# Patient Record
Sex: MALE | Race: WHITE | Employment: OTHER
[De-identification: names, ages, dates, MRNs, and addresses within clinical notes are randomized per-mention and may not be internally consistent; named-entity substitution may affect disease eponyms.]

---

## 2023-10-05 ENCOUNTER — OFFICE VISIT (OUTPATIENT)
Dept: FAMILY MEDICINE CLINIC | Facility: CLINIC | Age: 68
End: 2023-10-05
Payer: MEDICARE

## 2023-10-05 VITALS
WEIGHT: 151 LBS | RESPIRATION RATE: 18 BRPM | OXYGEN SATURATION: 96 % | SYSTOLIC BLOOD PRESSURE: 136 MMHG | HEIGHT: 67 IN | DIASTOLIC BLOOD PRESSURE: 82 MMHG | HEART RATE: 97 BPM | TEMPERATURE: 98.8 F | BODY MASS INDEX: 23.7 KG/M2

## 2023-10-05 DIAGNOSIS — Z13.220 LIPID SCREENING: ICD-10-CM

## 2023-10-05 DIAGNOSIS — Z13.31 DEPRESSION SCREENING NEGATIVE: ICD-10-CM

## 2023-10-05 DIAGNOSIS — Z12.11 COLON CANCER SCREENING: ICD-10-CM

## 2023-10-05 DIAGNOSIS — J44.9 CHRONIC OBSTRUCTIVE PULMONARY DISEASE, UNSPECIFIED COPD TYPE (HCC): Primary | ICD-10-CM

## 2023-10-05 DIAGNOSIS — Z72.0 TOBACCO ABUSE: ICD-10-CM

## 2023-10-05 DIAGNOSIS — E87.1 HYPONATREMIA: Primary | ICD-10-CM

## 2023-10-05 LAB
ALBUMIN SERPL-MCNC: 3.6 G/DL (ref 3.2–4.6)
ALBUMIN/GLOB SERPL: 1 (ref 0.4–1.6)
ALP SERPL-CCNC: 90 U/L (ref 50–136)
ALT SERPL-CCNC: 22 U/L (ref 12–65)
ANION GAP SERPL CALC-SCNC: 6 MMOL/L (ref 2–11)
AST SERPL-CCNC: 23 U/L (ref 15–37)
BASOPHILS # BLD: 0 K/UL (ref 0–0.2)
BASOPHILS NFR BLD: 0 % (ref 0–2)
BILIRUB SERPL-MCNC: 0.3 MG/DL (ref 0.2–1.1)
BUN SERPL-MCNC: 6 MG/DL (ref 8–23)
CALCIUM SERPL-MCNC: 9.2 MG/DL (ref 8.3–10.4)
CHLORIDE SERPL-SCNC: 112 MMOL/L (ref 101–110)
CO2 SERPL-SCNC: 27 MMOL/L (ref 21–32)
CREAT SERPL-MCNC: 0.9 MG/DL (ref 0.8–1.5)
DIFFERENTIAL METHOD BLD: ABNORMAL
EOSINOPHIL # BLD: 0 K/UL (ref 0–0.8)
EOSINOPHIL NFR BLD: 1 % (ref 0.5–7.8)
ERYTHROCYTE [DISTWIDTH] IN BLOOD BY AUTOMATED COUNT: 14.3 % (ref 11.9–14.6)
GLOBULIN SER CALC-MCNC: 3.7 G/DL (ref 2.8–4.5)
GLUCOSE SERPL-MCNC: 106 MG/DL (ref 65–100)
HCT VFR BLD AUTO: 33.8 % (ref 41.1–50.3)
HGB BLD-MCNC: 11.4 G/DL (ref 13.6–17.2)
IMM GRANULOCYTES # BLD AUTO: 0.1 K/UL (ref 0–0.5)
IMM GRANULOCYTES NFR BLD AUTO: 1 % (ref 0–5)
LYMPHOCYTES # BLD: 1.3 K/UL (ref 0.5–4.6)
LYMPHOCYTES NFR BLD: 22 % (ref 13–44)
MCH RBC QN AUTO: 42.5 PG (ref 26.1–32.9)
MCHC RBC AUTO-ENTMCNC: 33.7 G/DL (ref 31.4–35)
MCV RBC AUTO: 126.1 FL (ref 82–102)
MONOCYTES # BLD: 0.7 K/UL (ref 0.1–1.3)
MONOCYTES NFR BLD: 13 % (ref 4–12)
NEUTS SEG # BLD: 3.7 K/UL (ref 1.7–8.2)
NEUTS SEG NFR BLD: 63 % (ref 43–78)
NRBC # BLD: 0 K/UL (ref 0–0.2)
PLATELET # BLD AUTO: 141 K/UL (ref 150–450)
PMV BLD AUTO: 11.1 FL (ref 9.4–12.3)
POTASSIUM SERPL-SCNC: 3.9 MMOL/L (ref 3.5–5.1)
PROT SERPL-MCNC: 7.3 G/DL (ref 6.3–8.2)
RBC # BLD AUTO: 2.68 M/UL (ref 4.23–5.6)
SODIUM SERPL-SCNC: 145 MMOL/L (ref 133–143)
WBC # BLD AUTO: 5.8 K/UL (ref 4.3–11.1)

## 2023-10-05 PROCEDURE — 99204 OFFICE O/P NEW MOD 45 MIN: CPT | Performed by: FAMILY MEDICINE

## 2023-10-05 PROCEDURE — 1123F ACP DISCUSS/DSCN MKR DOCD: CPT | Performed by: FAMILY MEDICINE

## 2023-10-05 RX ORDER — DIAZEPAM 5 MG/1
5 TABLET ORAL 3 TIMES DAILY
COMMUNITY

## 2023-10-05 RX ORDER — NEBULIZER ACCESSORIES
1 KIT MISCELLANEOUS DAILY PRN
Qty: 1 KIT | Refills: 5 | Status: SHIPPED | OUTPATIENT
Start: 2023-10-05

## 2023-10-05 RX ORDER — ASPIRIN 81 MG/1
81 TABLET, CHEWABLE ORAL DAILY
COMMUNITY

## 2023-10-05 RX ORDER — IPRATROPIUM BROMIDE 21 UG/1
2 SPRAY, METERED NASAL EVERY 12 HOURS
COMMUNITY

## 2023-10-05 RX ORDER — IPRATROPIUM BROMIDE AND ALBUTEROL SULFATE 2.5; .5 MG/3ML; MG/3ML
1 SOLUTION RESPIRATORY (INHALATION) EVERY 4 HOURS
Qty: 360 EACH | Refills: 3
Start: 2023-10-05

## 2023-10-05 SDOH — ECONOMIC STABILITY: INCOME INSECURITY: HOW HARD IS IT FOR YOU TO PAY FOR THE VERY BASICS LIKE FOOD, HOUSING, MEDICAL CARE, AND HEATING?: NOT VERY HARD

## 2023-10-05 SDOH — ECONOMIC STABILITY: HOUSING INSECURITY
IN THE LAST 12 MONTHS, WAS THERE A TIME WHEN YOU DID NOT HAVE A STEADY PLACE TO SLEEP OR SLEPT IN A SHELTER (INCLUDING NOW)?: NO

## 2023-10-05 SDOH — ECONOMIC STABILITY: FOOD INSECURITY: WITHIN THE PAST 12 MONTHS, THE FOOD YOU BOUGHT JUST DIDN'T LAST AND YOU DIDN'T HAVE MONEY TO GET MORE.: NEVER TRUE

## 2023-10-05 SDOH — ECONOMIC STABILITY: FOOD INSECURITY: WITHIN THE PAST 12 MONTHS, YOU WORRIED THAT YOUR FOOD WOULD RUN OUT BEFORE YOU GOT MONEY TO BUY MORE.: SOMETIMES TRUE

## 2023-10-05 ASSESSMENT — ENCOUNTER SYMPTOMS
CHEST TIGHTNESS: 1
SHORTNESS OF BREATH: 1
DIARRHEA: 0
COUGH: 0
VOMITING: 0
NAUSEA: 0
WHEEZING: 1

## 2023-10-05 ASSESSMENT — PATIENT HEALTH QUESTIONNAIRE - PHQ9
1. LITTLE INTEREST OR PLEASURE IN DOING THINGS: 0
SUM OF ALL RESPONSES TO PHQ QUESTIONS 1-9: 0
SUM OF ALL RESPONSES TO PHQ QUESTIONS 1-9: 0
2. FEELING DOWN, DEPRESSED OR HOPELESS: 0
SUM OF ALL RESPONSES TO PHQ QUESTIONS 1-9: 0
SUM OF ALL RESPONSES TO PHQ9 QUESTIONS 1 & 2: 0
SUM OF ALL RESPONSES TO PHQ QUESTIONS 1-9: 0

## 2023-10-05 ASSESSMENT — COPD QUESTIONNAIRES: COPD: 1

## 2023-10-05 NOTE — PROGRESS NOTES
Alexandra Good (:  1955) is a 77 y.o. male,New patient, here for evaluation of the following chief complaint(s):  New Patient (Est care/ general check up. Fasting. H/O Copd, needs new nebulizer, smoker)         ASSESSMENT/PLAN:  1. Chronic obstructive pulmonary disease, unspecified COPD type ([address])- stable  -     Comprehensive Metabolic Panel; Future  -     CBC with Auto Differential; Future  -     ipratropium 0.5 mg-albuterol 2.5 mg (DUONEB) 0.5-2.5 (3) MG/3ML SOLN nebulizer solution; Inhale 3 mLs into the lungs every 4 hours, Disp-360 each, R-3NO PRINT  -     Nebulizer  2. Lipid screening- lipids denied  -     Comprehensive Metabolic Panel; Future  3. Colon cancer screening  -     POCT FECAL IMMUNOCHEMICAL TEST (FIT) ()  4. Tobacco abuse-cessation handout  5. Depression screening negative    FU 1 year and prn         Subjective   SUBJECTIVE/OBJECTIVE:  COPD  He complains of chest tightness, shortness of breath and wheezing. There is no cough. This is a chronic problem. The current episode started more than 1 year ago. The problem occurs every several days. The problem has been unchanged. Pertinent negatives include no chest pain. Review of Systems   Constitutional:  Negative for chills and fatigue. Respiratory:  Positive for shortness of breath and wheezing. Negative for cough. Cardiovascular:  Negative for chest pain and leg swelling. Gastrointestinal:  Negative for diarrhea, nausea and vomiting. Objective   Physical Exam  Vitals and nursing note reviewed. Constitutional:       General: He is not in acute distress. Appearance: Normal appearance. He is normal weight. HENT:      Head: Normocephalic and atraumatic. Nose: Nose normal.      Mouth/Throat:      Pharynx: Oropharynx is clear. Eyes:      Extraocular Movements: Extraocular movements intact.       Conjunctiva/sclera: Conjunctivae normal.   Cardiovascular:      Rate and Rhythm: Normal rate and regular

## 2023-10-25 ENCOUNTER — NURSE ONLY (OUTPATIENT)
Dept: FAMILY MEDICINE CLINIC | Facility: CLINIC | Age: 68
End: 2023-10-25

## 2023-10-25 DIAGNOSIS — E87.1 HYPONATREMIA: ICD-10-CM

## 2023-10-25 LAB — SODIUM SERPL-SCNC: 139 MMOL/L (ref 133–143)

## 2024-12-16 ENCOUNTER — TELEPHONE (OUTPATIENT)
Dept: FAMILY MEDICINE CLINIC | Facility: CLINIC | Age: 69
End: 2024-12-16

## 2024-12-23 ENCOUNTER — OFFICE VISIT (OUTPATIENT)
Dept: FAMILY MEDICINE CLINIC | Facility: CLINIC | Age: 69
End: 2024-12-23

## 2024-12-23 VITALS
TEMPERATURE: 98.3 F | RESPIRATION RATE: 18 BRPM | OXYGEN SATURATION: 96 % | SYSTOLIC BLOOD PRESSURE: 120 MMHG | HEIGHT: 67 IN | WEIGHT: 148 LBS | HEART RATE: 88 BPM | DIASTOLIC BLOOD PRESSURE: 62 MMHG | BODY MASS INDEX: 23.23 KG/M2

## 2024-12-23 DIAGNOSIS — Z13.220 LIPID SCREENING: ICD-10-CM

## 2024-12-23 DIAGNOSIS — Z72.0 TOBACCO ABUSE: ICD-10-CM

## 2024-12-23 DIAGNOSIS — K52.9 CHRONIC DIARRHEA: ICD-10-CM

## 2024-12-23 DIAGNOSIS — R09.89 DECREASED PULSES IN FEET: ICD-10-CM

## 2024-12-23 DIAGNOSIS — J44.9 CHRONIC OBSTRUCTIVE PULMONARY DISEASE, UNSPECIFIED COPD TYPE (HCC): Primary | ICD-10-CM

## 2024-12-23 DIAGNOSIS — D53.9 MACROCYTIC ANEMIA: ICD-10-CM

## 2024-12-23 LAB
ALBUMIN SERPL-MCNC: 3.5 G/DL (ref 3.2–4.6)
ALBUMIN/GLOB SERPL: 0.9 (ref 1–1.9)
ALP SERPL-CCNC: 87 U/L (ref 40–129)
ALT SERPL-CCNC: 8 U/L (ref 8–55)
ANION GAP SERPL CALC-SCNC: 12 MMOL/L (ref 7–16)
AST SERPL-CCNC: 20 U/L (ref 15–37)
BASOPHILS # BLD: 0 K/UL (ref 0–0.2)
BASOPHILS NFR BLD: 0 % (ref 0–2)
BILIRUB SERPL-MCNC: <0.2 MG/DL (ref 0–1.2)
BUN SERPL-MCNC: 9 MG/DL (ref 8–23)
CALCIUM SERPL-MCNC: 9 MG/DL (ref 8.8–10.2)
CHLORIDE SERPL-SCNC: 101 MMOL/L (ref 98–107)
CHOLEST SERPL-MCNC: 197 MG/DL (ref 0–200)
CO2 SERPL-SCNC: 27 MMOL/L (ref 20–29)
CREAT SERPL-MCNC: 0.94 MG/DL (ref 0.8–1.3)
DIFFERENTIAL METHOD BLD: ABNORMAL
EOSINOPHIL # BLD: 0 K/UL (ref 0–0.8)
EOSINOPHIL NFR BLD: 0 % (ref 0.5–7.8)
ERYTHROCYTE [DISTWIDTH] IN BLOOD BY AUTOMATED COUNT: 14.2 % (ref 11.9–14.6)
GLOBULIN SER CALC-MCNC: 3.7 G/DL (ref 2.3–3.5)
GLUCOSE SERPL-MCNC: 96 MG/DL (ref 70–99)
HCT VFR BLD AUTO: 34.1 % (ref 41.1–50.3)
HDLC SERPL-MCNC: 30 MG/DL (ref 40–60)
HDLC SERPL: 6.5 (ref 0–5)
HGB BLD-MCNC: 11.6 G/DL (ref 13.6–17.2)
IMM GRANULOCYTES # BLD AUTO: 0.1 K/UL (ref 0–0.5)
IMM GRANULOCYTES NFR BLD AUTO: 1 % (ref 0–5)
LDLC SERPL CALC-MCNC: 112 MG/DL (ref 0–100)
LYMPHOCYTES # BLD: 1.3 K/UL (ref 0.5–4.6)
LYMPHOCYTES NFR BLD: 23 % (ref 13–44)
MCH RBC QN AUTO: 41 PG (ref 26.1–32.9)
MCHC RBC AUTO-ENTMCNC: 34 G/DL (ref 31.4–35)
MCV RBC AUTO: 120.5 FL (ref 82–102)
MONOCYTES # BLD: 1.2 K/UL (ref 0.1–1.3)
MONOCYTES NFR BLD: 21 % (ref 4–12)
NEUTS SEG # BLD: 3.2 K/UL (ref 1.7–8.2)
NEUTS SEG NFR BLD: 55 % (ref 43–78)
NRBC # BLD: 0 K/UL (ref 0–0.2)
PLATELET # BLD AUTO: 164 K/UL (ref 150–450)
PLATELET COMMENT: ADEQUATE
PMV BLD AUTO: 10.8 FL (ref 9.4–12.3)
POTASSIUM SERPL-SCNC: 3.8 MMOL/L (ref 3.5–5.1)
PROT SERPL-MCNC: 7.2 G/DL (ref 6.3–8.2)
RBC # BLD AUTO: 2.83 M/UL (ref 4.23–5.6)
RBC MORPH BLD: ABNORMAL
RBC MORPH BLD: ABNORMAL
SODIUM SERPL-SCNC: 140 MMOL/L (ref 136–145)
TRIGL SERPL-MCNC: 276 MG/DL (ref 0–150)
VIT B12 SERPL-MCNC: 537 PG/ML (ref 193–986)
VLDLC SERPL CALC-MCNC: 55 MG/DL (ref 6–23)
WBC # BLD AUTO: 5.8 K/UL (ref 4.3–11.1)
WBC MORPH BLD: ABNORMAL

## 2024-12-23 ASSESSMENT — ENCOUNTER SYMPTOMS
COUGH: 0
CHEST TIGHTNESS: 1
VOMITING: 0
SHORTNESS OF BREATH: 1
DIARRHEA: 0
WHEEZING: 1
NAUSEA: 0

## 2024-12-23 ASSESSMENT — COPD QUESTIONNAIRES: COPD: 1

## 2024-12-23 NOTE — PROGRESS NOTES
Lei Garcia (:  1955) is a 69 y.o. male,Established patient, here for evaluation of the following chief complaint(s):  Follow-up (Chronic care follow up.), COPD (Stable, chronic), Nicotine Dependence (Would like to quit), and Circulatory Problem (Told by nurse from insurance)         Assessment & Plan  Chronic obstructive pulmonary disease, unspecified COPD type (HCC)   Chronic, at goal (stable), continue current treatment plan  Repeat labs/stable  Orders:  •  CBC with Auto Differential; Future  •  Comprehensive Metabolic Panel; Future  •  Comprehensive Metabolic Panel  •  CBC with Auto Differential    Macrocytic anemia   Chronic, not at goal (unstable), recheck CBC with B12 level    Orders:  •  CBC with Auto Differential; Future  •  Vitamin B12; Future  •  Vitamin B12  •  CBC with Auto Differential    Lipid screening    Check labs    Orders:  •  Comprehensive Metabolic Panel; Future  •  Lipid Panel; Future  •  Lipid Panel  •  Comprehensive Metabolic Panel    Tobacco abuse   Chronic, not at goal (unstable), lifestyle modifications recommended  Pt going to try to quit on his own. I encouraged him to let me know if he needs help.       Decreased pulses in feet    Bilateral feet with poor pulses, hair loss- check dopplers (arterial) bilaterally    Orders:  •  Vascular duplex lower extremity arteries bilateral; Future    Chronic diarrhea   Chronic, not at goal (unstable), refer to GI for eval/treatment    Orders:  •  [facility] - Yoni Perdomo MD, Gastroenterology, [facility]      Return in about 6 months (around 2025) for fasting chronic care.       Subjective   COPD  He complains of chest tightness, shortness of breath and wheezing. There is no cough. This is a chronic problem. The current episode started more than 1 year ago. The problem occurs intermittently. The problem has been unchanged. Pertinent negatives include no chest pain.   Nicotine Dependence  Presents for follow-up visit. Symptoms are

## 2025-01-10 ENCOUNTER — TELEPHONE (OUTPATIENT)
Dept: FAMILY MEDICINE CLINIC | Facility: CLINIC | Age: 70
End: 2025-01-10

## 2025-01-10 DIAGNOSIS — R23.0 TOE CYANOSIS: ICD-10-CM

## 2025-01-10 DIAGNOSIS — R09.89 DECREASED PEDAL PULSES: Primary | ICD-10-CM

## 2025-01-10 NOTE — TELEPHONE ENCOUNTER
Patient called their insurance and they stated that the vascular duplex lower lower extremity arteries bilateral is not covered. Can you send in for an alternative procedure?       Call back at [phone]

## 2025-01-27 ENCOUNTER — HOSPITAL ENCOUNTER (OUTPATIENT)
Dept: ULTRASOUND IMAGING | Age: 70
Discharge: HOME | End: 2025-01-30
Attending: FAMILY MEDICINE
Payer: MEDICARE

## 2025-01-27 DIAGNOSIS — R23.0 TOE CYANOSIS: ICD-10-CM

## 2025-01-27 DIAGNOSIS — R09.89 DECREASED PEDAL PULSES: ICD-10-CM

## 2025-01-27 PROCEDURE — 93922 UPR/L XTREMITY ART 2 LEVELS: CPT

## 2025-01-27 PROCEDURE — 93925 LOWER EXTREMITY STUDY: CPT | Performed by: RADIOLOGY

## 2025-01-31 ENCOUNTER — TELEPHONE (OUTPATIENT)
Dept: FAMILY MEDICINE CLINIC | Facility: CLINIC | Age: 70
End: 2025-01-31

## 2025-01-31 NOTE — TELEPHONE ENCOUNTER
Patient wife called and request to talk with nurse or Dr. Ruffin, pt would like to know the results of the exams.    Thank you.

## 2025-02-02 DIAGNOSIS — I73.9 PAD (PERIPHERAL ARTERY DISEASE) (HCC): Primary | ICD-10-CM

## 2025-02-03 NOTE — TELEPHONE ENCOUNTER
Called patient and released the results of the doppler test and stated that vascular surgery will be reaching out as the referral was placed.

## 2025-02-13 ENCOUNTER — OFFICE VISIT (OUTPATIENT)
Dept: VASCULAR SURGERY | Age: 70
End: 2025-02-13
Payer: MEDICARE

## 2025-02-13 VITALS
HEIGHT: 67 IN | SYSTOLIC BLOOD PRESSURE: 136 MMHG | OXYGEN SATURATION: 98 % | WEIGHT: 148 LBS | BODY MASS INDEX: 23.23 KG/M2 | DIASTOLIC BLOOD PRESSURE: 83 MMHG

## 2025-02-13 DIAGNOSIS — I73.9 PAD (PERIPHERAL ARTERY DISEASE) (HCC): Primary | ICD-10-CM

## 2025-02-13 DIAGNOSIS — F17.200 HIGH DEPENDENCE ON SMOKING: ICD-10-CM

## 2025-02-13 PROCEDURE — 99204 OFFICE O/P NEW MOD 45 MIN: CPT | Performed by: STUDENT IN AN ORGANIZED HEALTH CARE EDUCATION/TRAINING PROGRAM

## 2025-02-13 PROCEDURE — 1159F MED LIST DOCD IN RCRD: CPT | Performed by: STUDENT IN AN ORGANIZED HEALTH CARE EDUCATION/TRAINING PROGRAM

## 2025-02-13 PROCEDURE — 1123F ACP DISCUSS/DSCN MKR DOCD: CPT | Performed by: STUDENT IN AN ORGANIZED HEALTH CARE EDUCATION/TRAINING PROGRAM

## 2025-02-13 NOTE — PROGRESS NOTES
is recommended.    No orders of the defined types were placed in this encounter.      01/27/25    VAS DUP LOWER EXT ARTERIES BILATERAL W ZAID 01/27/2025  3:08 PM (Final)    Narrative  TITLE: LOWER extremity arterial ultrasound examination.    INDICATION: Skin color changes. Claudication.    TECHNIQUE: Grayscale, color, and Doppler interrogation performed.  The entire  length of all of the arteries of the extremity were evaluated.    COMPARISON: None.      ZAID:  Right = 0.70 with digital pressure of 17  Left = 0.73 with digital pressure of 10    RIGHT LOWER EXTREMITY: Proximal waveforms are triphasic and biphasic. There is  mid SFA occlusion with proximal popliteal reconstitution. Diminished distal  waveforms.    LEFT LOWER EXTREMITY: Proximal by merrily biphasic waveforms with distal more  monophasic waveforms..    ABDOMEN:  No aneurysm is seen.    Impression  1. Bilateral ABIs are moderately diminished.  2. There is right lower extremity mid SFA occlusion with proximal popliteal  reconstitution..      Electronically signed by HEIDI LOZA    Signed by: Heidi Loza MD on 1/27/2025  3:08 PM      Physical Examination:   /83 (Site: Right Upper Arm, Position: Sitting, Cuff Size: Medium Adult)   Ht 1.702 m (5' 7\")   Wt 67.1 kg (148 lb)   BMI 23.18 kg/m²     General: No acute distress  HENT: AT  CV: Regular rhythm.    LUNG: No respiratory distress on RA  Abdominal: No distension.  Extremities: No wounds or edema, motor and sensation grossly intact  Vascular:     No past medical history on file.    Current Outpatient Medications   Medication Sig Dispense Refill    aspirin 81 MG chewable tablet Take 1 tablet by mouth daily      diazePAM (VALIUM) 5 MG tablet Take 1 tablet by mouth 3 times daily.      ipratropium (ATROVENT) 0.03 % nasal spray 2 sprays by Each Nostril route in the morning and 2 sprays in the evening.      ipratropium 0.5 mg-albuterol 2.5 mg (DUONEB) 0.5-2.5 (3) MG/3ML SOLN nebulizer solution

## 2025-03-11 ENCOUNTER — RESULTS FOLLOW-UP (OUTPATIENT)
Dept: FAMILY MEDICINE CLINIC | Facility: CLINIC | Age: 70
End: 2025-03-11

## 2025-03-11 ENCOUNTER — OFFICE VISIT (OUTPATIENT)
Dept: FAMILY MEDICINE CLINIC | Facility: CLINIC | Age: 70
End: 2025-03-11
Payer: MEDICARE

## 2025-03-11 VITALS
BODY MASS INDEX: 21.82 KG/M2 | TEMPERATURE: 98 F | HEIGHT: 67 IN | HEART RATE: 90 BPM | DIASTOLIC BLOOD PRESSURE: 80 MMHG | RESPIRATION RATE: 98 BRPM | WEIGHT: 139 LBS | SYSTOLIC BLOOD PRESSURE: 132 MMHG

## 2025-03-11 DIAGNOSIS — D53.9 MACROCYTIC ANEMIA: ICD-10-CM

## 2025-03-11 DIAGNOSIS — K52.9 CHRONIC DIARRHEA: ICD-10-CM

## 2025-03-11 DIAGNOSIS — Z00.00 INITIAL MEDICARE ANNUAL WELLNESS VISIT: Primary | ICD-10-CM

## 2025-03-11 DIAGNOSIS — Z13.6 SCREENING FOR AAA (ABDOMINAL AORTIC ANEURYSM): ICD-10-CM

## 2025-03-11 DIAGNOSIS — Z87.891 PERSONAL HISTORY OF TOBACCO USE, PRESENTING HAZARDS TO HEALTH: ICD-10-CM

## 2025-03-11 DIAGNOSIS — Z72.89 OTHER PROBLEMS RELATED TO LIFESTYLE: ICD-10-CM

## 2025-03-11 DIAGNOSIS — Z11.59 NEED FOR HEPATITIS C SCREENING TEST: ICD-10-CM

## 2025-03-11 LAB
ALBUMIN SERPL-MCNC: 3.5 G/DL (ref 3.2–4.6)
ALBUMIN/GLOB SERPL: 0.9 (ref 1–1.9)
ALP SERPL-CCNC: 86 U/L (ref 40–129)
ALT SERPL-CCNC: 9 U/L (ref 8–55)
ANION GAP SERPL CALC-SCNC: 16 MMOL/L (ref 7–16)
AST SERPL-CCNC: 18 U/L (ref 15–37)
BASOPHILS # BLD: 0.02 K/UL (ref 0–0.2)
BASOPHILS NFR BLD: 0.2 % (ref 0–2)
BILIRUB SERPL-MCNC: 0.3 MG/DL (ref 0–1.2)
BUN SERPL-MCNC: 12 MG/DL (ref 8–23)
CALCIUM SERPL-MCNC: 9.5 MG/DL (ref 8.8–10.2)
CHLORIDE SERPL-SCNC: 90 MMOL/L (ref 98–107)
CO2 SERPL-SCNC: 26 MMOL/L (ref 20–29)
CREAT SERPL-MCNC: 1.37 MG/DL (ref 0.8–1.3)
DIFFERENTIAL METHOD BLD: ABNORMAL
EOSINOPHIL # BLD: 0 K/UL (ref 0–0.8)
EOSINOPHIL NFR BLD: 0 % (ref 0.5–7.8)
ERYTHROCYTE [DISTWIDTH] IN BLOOD BY AUTOMATED COUNT: 13.7 % (ref 11.9–14.6)
GLOBULIN SER CALC-MCNC: 3.9 G/DL (ref 2.3–3.5)
GLUCOSE SERPL-MCNC: 143 MG/DL (ref 70–99)
HCT VFR BLD AUTO: 29.9 % (ref 41.1–50.3)
HCV AB SER QL: NONREACTIVE
HGB BLD-MCNC: 10.2 G/DL (ref 13.6–17.2)
IMM GRANULOCYTES # BLD AUTO: 0.07 K/UL (ref 0–0.5)
IMM GRANULOCYTES NFR BLD AUTO: 0.6 % (ref 0–5)
LYMPHOCYTES # BLD: 1.18 K/UL (ref 0.5–4.6)
LYMPHOCYTES NFR BLD: 10.6 % (ref 13–44)
MCH RBC QN AUTO: 38.9 PG (ref 26.1–32.9)
MCHC RBC AUTO-ENTMCNC: 34.1 G/DL (ref 31.4–35)
MCV RBC AUTO: 114.1 FL (ref 82–102)
MONOCYTES # BLD: 1.17 K/UL (ref 0.1–1.3)
MONOCYTES NFR BLD: 10.5 % (ref 4–12)
NEUTS SEG # BLD: 8.68 K/UL (ref 1.7–8.2)
NEUTS SEG NFR BLD: 78.1 % (ref 43–78)
NRBC # BLD: 0 K/UL (ref 0–0.2)
PLATELET # BLD AUTO: 253 K/UL (ref 150–450)
PMV BLD AUTO: 10.5 FL (ref 9.4–12.3)
POTASSIUM SERPL-SCNC: 3 MMOL/L (ref 3.5–5.1)
PROT SERPL-MCNC: 7.4 G/DL (ref 6.3–8.2)
RBC # BLD AUTO: 2.62 M/UL (ref 4.23–5.6)
SODIUM SERPL-SCNC: 132 MMOL/L (ref 136–145)
WBC # BLD AUTO: 11.1 K/UL (ref 4.3–11.1)

## 2025-03-11 PROCEDURE — G2211 COMPLEX E/M VISIT ADD ON: HCPCS | Performed by: FAMILY MEDICINE

## 2025-03-11 PROCEDURE — 1159F MED LIST DOCD IN RCRD: CPT | Performed by: FAMILY MEDICINE

## 2025-03-11 PROCEDURE — 99214 OFFICE O/P EST MOD 30 MIN: CPT | Performed by: FAMILY MEDICINE

## 2025-03-11 PROCEDURE — G0438 PPPS, INITIAL VISIT: HCPCS | Performed by: FAMILY MEDICINE

## 2025-03-11 PROCEDURE — 1123F ACP DISCUSS/DSCN MKR DOCD: CPT | Performed by: FAMILY MEDICINE

## 2025-03-11 PROCEDURE — 1160F RVW MEDS BY RX/DR IN RCRD: CPT | Performed by: FAMILY MEDICINE

## 2025-03-11 SDOH — ECONOMIC STABILITY: FOOD INSECURITY: WITHIN THE PAST 12 MONTHS, YOU WORRIED THAT YOUR FOOD WOULD RUN OUT BEFORE YOU GOT MONEY TO BUY MORE.: NEVER TRUE

## 2025-03-11 SDOH — ECONOMIC STABILITY: FOOD INSECURITY: WITHIN THE PAST 12 MONTHS, THE FOOD YOU BOUGHT JUST DIDN'T LAST AND YOU DIDN'T HAVE MONEY TO GET MORE.: NEVER TRUE

## 2025-03-11 ASSESSMENT — PATIENT HEALTH QUESTIONNAIRE - PHQ9
SUM OF ALL RESPONSES TO PHQ QUESTIONS 1-9: 0
1. LITTLE INTEREST OR PLEASURE IN DOING THINGS: NOT AT ALL
SUM OF ALL RESPONSES TO PHQ QUESTIONS 1-9: 0
2. FEELING DOWN, DEPRESSED OR HOPELESS: NOT AT ALL

## 2025-03-11 ASSESSMENT — LIFESTYLE VARIABLES
HOW OFTEN DO YOU HAVE A DRINK CONTAINING ALCOHOL: 2-3 TIMES A WEEK
HOW MANY STANDARD DRINKS CONTAINING ALCOHOL DO YOU HAVE ON A TYPICAL DAY: 1 OR 2

## 2025-03-11 ASSESSMENT — ENCOUNTER SYMPTOMS
VOMITING: 0
NAUSEA: 0
DIARRHEA: 1
COUGH: 0
SHORTNESS OF BREATH: 0

## 2025-03-11 NOTE — PROGRESS NOTES
[facility]  [facility]  [address] [place] [address]   [phone] Fax [phone]    Lei Garcia, : 1955, AGE: 70 y.o.  Chief Complaint   Patient presents with    Other     Chronic diarrhea with incontinence.     Medicare AWV     Vitals:    25 1441   BP: 132/80   Pulse: 90   Resp: (!) 98   Temp: 98 °F (36.7 °C)     Social History     Tobacco Use    Smoking status: Every Day     Types: Cigarettes    Smokeless tobacco: Never   Vaping Use    Vaping status: Never Used   Substance Use Topics    Alcohol use: Yes     Current Outpatient Medications   Medication Sig Dispense Refill    aspirin 81 MG chewable tablet Take 1 tablet by mouth daily      diazePAM (VALIUM) 5 MG tablet Take 1 tablet by mouth 3 times daily.      ipratropium (ATROVENT) 0.03 % nasal spray 2 sprays by Each Nostril route in the morning and 2 sprays in the evening.      ipratropium 0.5 mg-albuterol 2.5 mg (DUONEB) 0.5-2.5 (3) MG/3ML SOLN nebulizer solution Inhale 3 mLs into the lungs every 4 hours 360 each 3    Nebulizers (COMPRESSOR/NEBULIZER) MISC Use 4x/d as directed  COPD J44.9 1 each 0    Respiratory Therapy Supplies (NEBULIZER/TUBING/MOUTHPIECE) KIT 1 kit by Does not apply route daily as needed (sob/wheeze) J44.9 1 kit 5     No current facility-administered medications for this visit.     No Known Allergies  HPI   History of Present Illness  History of Present Illness  The patient is a 70-year-old white male who presents today for complaints of chronic diarrhea with stool incontinence at night. The stool is very watery, almost like urine when it comes out at night, but it is not urine per the patient. He also needs a Medicare Annual Wellness Visit.     Reviewed pertinent past medical history, surgical history, social history, and family history as noted in patient record.     ROS  Review of Systems   Constitutional:  Negative for chills and fatigue.   Respiratory:  Negative for cough and

## 2025-03-11 NOTE — PATIENT INSTRUCTIONS
Learning About Being Active as an Older Adult  Why is being active important as you get older?     Being active is one of the best things you can do for your health. And it's never too late to start. Being active--or getting active, if you aren't already--has definite benefits. It can:  Give you more energy,  Keep your mind sharp.  Improve balance to reduce your risk of falls.  Help you manage chronic illness with fewer medicines.  No matter how old you are, how fit you are, or what health problems you have, there is a form of activity that will work for you. And the more physical activity you can do, the better your overall health will be.  What kinds of activity can help you stay healthy?  Being more active will make your daily activities easier. Physical activity includes planned exercise and things you do in daily life. There are four types of activity:  Aerobic.  Doing aerobic activity makes your heart and lungs strong.  Includes walking, dancing, and gardening.  Aim for at least 2½ hours spread throughout the week.  It improves your energy and can help you sleep better.  Muscle-strengthening.  This type of activity can help maintain muscle and strengthen bones.  Includes climbing stairs, using resistance bands, and lifting or carrying heavy loads.  Aim for at least twice a week.  It can help protect the knees and other joints.  Stretching.  Stretching gives you better range of motion in joints and muscles.  Includes upper arm stretches, calf stretches, and gentle yoga.  Aim for at least twice a week, preferably after your muscles are warmed up from other activities.  It can help you function better in daily life.  Balancing.  This helps you stay coordinated and have good posture.  Includes heel-to-toe walking, alberta chi, and certain types of yoga.  Aim for at least 3 days a week.  It can reduce your risk of falling.  Even if you have a hard time meeting the recommendations, it's better to be more active

## 2025-03-12 DIAGNOSIS — R73.09 ELEVATED GLUCOSE: Primary | ICD-10-CM

## 2025-03-12 DIAGNOSIS — R79.89 ELEVATED SERUM CREATININE: ICD-10-CM

## 2025-03-12 DIAGNOSIS — E87.1 HYPONATREMIA: ICD-10-CM

## 2025-03-12 DIAGNOSIS — E87.6 HYPOKALEMIA: ICD-10-CM

## 2025-03-14 DIAGNOSIS — K52.9 CHRONIC DIARRHEA: ICD-10-CM

## 2025-03-14 LAB — C. DIFFICILE TOXIN MOLECULAR: NEGATIVE

## 2025-03-17 LAB
BACTERIA SPEC CULT: NORMAL
SERVICE CMNT-IMP: NORMAL

## 2025-03-18 LAB
SPECIMEN SOURCE: NORMAL
WHITE BLOOD CELLS (WBC), STOOL: NORMAL

## 2025-03-20 LAB
O+P SPEC MICRO: NORMAL
O+P STL CONC: NORMAL
SPECIMEN SOURCE: NORMAL

## 2025-03-24 ENCOUNTER — LAB (OUTPATIENT)
Dept: FAMILY MEDICINE CLINIC | Facility: CLINIC | Age: 70
End: 2025-03-24

## 2025-03-24 ENCOUNTER — RESULTS FOLLOW-UP (OUTPATIENT)
Dept: FAMILY MEDICINE CLINIC | Facility: CLINIC | Age: 70
End: 2025-03-24

## 2025-03-24 DIAGNOSIS — R79.89 ELEVATED SERUM CREATININE: ICD-10-CM

## 2025-03-24 DIAGNOSIS — E87.6 HYPOKALEMIA: ICD-10-CM

## 2025-03-24 DIAGNOSIS — R73.09 ELEVATED GLUCOSE: ICD-10-CM

## 2025-03-24 DIAGNOSIS — E87.1 HYPONATREMIA: ICD-10-CM

## 2025-03-24 DIAGNOSIS — E87.1 HYPONATREMIA: Primary | ICD-10-CM

## 2025-03-24 LAB
ANION GAP SERPL CALC-SCNC: 14 MMOL/L (ref 7–16)
BUN SERPL-MCNC: 12 MG/DL (ref 8–23)
CALCIUM SERPL-MCNC: 9.6 MG/DL (ref 8.8–10.2)
CHLORIDE SERPL-SCNC: 89 MMOL/L (ref 98–107)
CO2 SERPL-SCNC: 32 MMOL/L (ref 20–29)
CREAT SERPL-MCNC: 1.3 MG/DL (ref 0.8–1.3)
EST. AVERAGE GLUCOSE BLD GHB EST-MCNC: 114 MG/DL
GLUCOSE SERPL-MCNC: 118 MG/DL (ref 70–99)
HBA1C MFR BLD: 5.6 % (ref 0–5.6)
POTASSIUM SERPL-SCNC: 3.2 MMOL/L (ref 3.5–5.1)
SODIUM SERPL-SCNC: 136 MMOL/L (ref 136–145)

## 2025-03-31 ENCOUNTER — RESULTS FOLLOW-UP (OUTPATIENT)
Dept: FAMILY MEDICINE CLINIC | Facility: CLINIC | Age: 70
End: 2025-03-31

## 2025-03-31 ENCOUNTER — LAB (OUTPATIENT)
Dept: FAMILY MEDICINE CLINIC | Facility: CLINIC | Age: 70
End: 2025-03-31

## 2025-03-31 DIAGNOSIS — R79.89 ELEVATED SERUM CREATININE: ICD-10-CM

## 2025-03-31 DIAGNOSIS — E87.1 HYPONATREMIA: ICD-10-CM

## 2025-03-31 DIAGNOSIS — E87.6 HYPOKALEMIA: Primary | ICD-10-CM

## 2025-03-31 LAB
ANION GAP SERPL CALC-SCNC: 13 MMOL/L (ref 7–16)
BUN SERPL-MCNC: 11 MG/DL (ref 8–23)
CALCIUM SERPL-MCNC: 9.3 MG/DL (ref 8.8–10.2)
CHLORIDE SERPL-SCNC: 84 MMOL/L (ref 98–107)
CO2 SERPL-SCNC: 33 MMOL/L (ref 20–29)
CREAT SERPL-MCNC: 1.17 MG/DL (ref 0.8–1.3)
GLUCOSE SERPL-MCNC: 111 MG/DL (ref 70–99)
POTASSIUM SERPL-SCNC: 3.1 MMOL/L (ref 3.5–5.1)
SODIUM SERPL-SCNC: 130 MMOL/L (ref 136–145)

## 2025-04-02 ENCOUNTER — TELEPHONE (OUTPATIENT)
Dept: FAMILY MEDICINE CLINIC | Facility: CLINIC | Age: 70
End: 2025-04-02

## 2025-04-23 ENCOUNTER — TELEPHONE (OUTPATIENT)
Age: 70
End: 2025-04-23

## 2025-04-23 ENCOUNTER — PREP FOR PROCEDURE (OUTPATIENT)
Age: 70
End: 2025-04-23

## 2025-04-23 ENCOUNTER — OFFICE VISIT (OUTPATIENT)
Age: 70
End: 2025-04-23
Payer: MEDICARE

## 2025-04-23 VITALS
SYSTOLIC BLOOD PRESSURE: 143 MMHG | BODY MASS INDEX: 21.03 KG/M2 | OXYGEN SATURATION: 96 % | DIASTOLIC BLOOD PRESSURE: 83 MMHG | HEIGHT: 67 IN | WEIGHT: 134 LBS | HEART RATE: 79 BPM

## 2025-04-23 DIAGNOSIS — K12.0 APHTHOUS ULCER OF MOUTH: ICD-10-CM

## 2025-04-23 DIAGNOSIS — R63.4 UNINTENTIONAL WEIGHT LOSS: ICD-10-CM

## 2025-04-23 DIAGNOSIS — D64.9 ANEMIA, UNSPECIFIED TYPE: ICD-10-CM

## 2025-04-23 DIAGNOSIS — C18.7 MALIGNANT NEOPLASM OF SIGMOID COLON (HCC): ICD-10-CM

## 2025-04-23 DIAGNOSIS — K52.9 CHRONIC DIARRHEA: ICD-10-CM

## 2025-04-23 DIAGNOSIS — K52.9 CHRONIC DIARRHEA: Primary | ICD-10-CM

## 2025-04-23 LAB
CRP SERPL-MCNC: 5.2 MG/DL (ref 0–0.4)
ERYTHROCYTE [SEDIMENTATION RATE] IN BLOOD: 32 MM/HR
FERRITIN SERPL-MCNC: 698 NG/ML (ref 8–388)
IRON SATN MFR SERPL: 23 % (ref 20–50)
IRON SERPL-MCNC: 55 UG/DL (ref 35–100)
TIBC SERPL-MCNC: 242 UG/DL (ref 240–450)
UIBC SERPL-MCNC: 187 UG/DL (ref 112–347)

## 2025-04-23 PROCEDURE — 1159F MED LIST DOCD IN RCRD: CPT | Performed by: PHYSICIAN ASSISTANT

## 2025-04-23 PROCEDURE — 99204 OFFICE O/P NEW MOD 45 MIN: CPT | Performed by: PHYSICIAN ASSISTANT

## 2025-04-23 PROCEDURE — 1160F RVW MEDS BY RX/DR IN RCRD: CPT | Performed by: PHYSICIAN ASSISTANT

## 2025-04-23 PROCEDURE — 1123F ACP DISCUSS/DSCN MKR DOCD: CPT | Performed by: PHYSICIAN ASSISTANT

## 2025-04-23 RX ORDER — SODIUM CHLORIDE 0.9 % (FLUSH) 0.9 %
5-40 SYRINGE (ML) INJECTION EVERY 12 HOURS SCHEDULED
Status: CANCELLED | OUTPATIENT
Start: 2025-04-23

## 2025-04-23 RX ORDER — SODIUM CHLORIDE 0.9 % (FLUSH) 0.9 %
5-40 SYRINGE (ML) INJECTION PRN
Status: CANCELLED | OUTPATIENT
Start: 2025-04-23

## 2025-04-23 RX ORDER — OMEPRAZOLE 20 MG/1
20 CAPSULE, DELAYED RELEASE ORAL DAILY
COMMUNITY

## 2025-04-23 RX ORDER — SODIUM CHLORIDE 9 MG/ML
25 INJECTION, SOLUTION INTRAVENOUS PRN
Status: CANCELLED | OUTPATIENT
Start: 2025-04-23

## 2025-04-23 NOTE — TELEPHONE ENCOUNTER
Patient in office, per Melissa Grinnell schedule colonoscopy and EGD ASAP. Scheduled with Dr. Schwab on Friday 4/25/2025 at [facility]. Miralax prep instructions given to the patient in office.      Items to purchase at pharmacy 5 days before your procedure:  Dulcolax tablets-- (LAXATIVE ONLY- not stool softener)  238-gram bottle of MiraLAX  64 ounces Gatorade, Crystal Light, Apple juice (NO RED BLUE OR PURPLE IN COLOR)    The day before your test: Thursday 4/24/2025  Clear liquids only for the entire day (water, coffee, tea, Sprite, 7-up, Ginger ale, popsicles, chicken broth, beef broth, apple juice, Gatorade, crystal lite) NO MILK, CREAMER, OR DAIRY PRODUCTS. NOTHING RED, BLUE OR PURPLE IN COLOR. NO SOLID FOODS.   Mix MiraLax with Gatorade, Crystal Lite or Apple juice (NO RED, BLUE OR PURPLE)  At 12:00 PM Take 2 Dulcolax tablets with 16 ounces of water.  At 12:00 PM Start drinking MiraLax solution. Drink 8 ounces every 30 minutes until gone.  NO MORE LIQUIDS AFTER MIDNIGHT

## 2025-04-23 NOTE — PROGRESS NOTES
Lei Garcia (:  1955) is a 70 y.o. male new patient referred to our office for evaluation of the following chief complaint(s):  Diarrhea (For over a month)        Assessment & Plan   ASSESSMENT/PLAN:  1. Chronic diarrhea  -     Calprotectin Stool; Future  -     C-Reactive Protein; Future  -     Sedimentation Rate; Future  2. Anemia, unspecified type  -     Ferritin; Future  -     Iron and TIBC; Future  3. Unintentional weight loss  4. Aphthous ulcer of mouth      Assessment & Plan  1. Diarrhea and blood in stool: Chronic for many years. No prior colonoscopy.   - Stool culture, c.diff testing, O&P, fecal leukocytes were negative 3/14/2025.   - Check inflammatory markers.  - Schedule colonoscopy to r/o colon mass and/or IBD.   - Counseled on basic diarrhea measures.   - Utilize Imodium 1-2 tablets QID PRN.     2. Unintentional weight loss: 15 lbs since 2024.  - Will perform simultaneous EGD with colonoscopy.  - Consider CT imaging CAP if endoscopy unrevealing.    3. Anemia: Chronic.   - Check iron studies.  - Endoscopy to r/o GI blood loss.     4. Oral sores: Chronic. ?IBD  - May warrant additional work-up if no evidence of IBD ?immunocompromised    Follow-up: after endoscopy    Results  Labs   - Hemoglobin: Low   - Infectious diarrhea test: Negative   - Culture: Negative   - C. difficile test: Negative    Return for scheduled EGD, scheduled colonoscopy, follow-up visit 1-2 weeks after procedure.         Subjective   SUBJECTIVE/OBJECTIVE  Lei Garcia is a 70 y.o. year old male referred to our office for evaluation of chronic diarrhea. Referral note reviewed from 3/11/2025.  Same-day labs show chronic anemia with Hgb 10.2.  C.diff, stool culture, O&P, fecal leukocytes were negative 3/14/2025. No prior GI or endoscopy records discovered on chart review.    History of Present Illness  The patient presents for evaluation of diarrhea.    Experiencing diarrhea for 2-3 months, initially evaluated by

## 2025-04-23 NOTE — PATIENT INSTRUCTIONS
For diarrhea:   Take Imodium 1 to 2 tablets up to 4 times daily as needed for diarrhea. If you are frequently having urgent diarrhea following meals, schedule Imodium dose 30 minutes prior to meals. Be sure to stay well hydrated. Avoid alcohol as well as food and beverages high in fat, sugar, artificial sweeteners or stimulants such as nicotine or caffeine. Increase daily fiber intake to 25-35 grams daily either by dietary intake or an over-the-counter psyllium husk fiber supplement. Keep a diet journal to evaluate for any food triggers.  You can try OTC IBgard (peppermint oil supplement) as needed for abdominal cramping, bloating, or discomfort.

## 2025-04-24 NOTE — PROGRESS NOTES
Patient verified name, , and procedure.    Type: 1A; abbreviated assessment per anesthesia guidelines.    Labs per anesthesia: None.  EKG: Not needed, per anesthesia guidelines.     Instructed pt that they will be notified the day before their procedure by the GI Lab for time of arrival if their procedure is [place] and Pre-op for [place] cases. Arrival times should be called by 5 pm. If no phone is received the patient should contact their respective hospital. The GI lab telephone number is [phone] and ES Pre-op is [phone].     Follow diet and prep instructions per office, including NPO status.      Bath or shower the night before and the am of surgery with non-moisturizing soap. No lotions, oils, powders, perfumes, or cologne on skin. No make up, eye make up or jewelry. Wear loose fitting comfortable, clean clothing.     Must have adult present in building the entire time .     Medications to take on the day of procedure: Diazepam- if needed, Duoneb nebulizer, Omeprazole, per anesthesia guidelines.    Medications to hold: Aspirin- hold starting now, per anesthesia guidelines.     Patient instructed to hold all vitamins/supplements 7 days prior to surgery and NSAIDS 5 days prior to surgery. Verbalized understanding.     The following discharge instructions reviewed with patient: medication given during procedure may cause drowsiness for several hours, therefore, do not drive or operate machinery for remainder of the day. You may not drink alcohol on the day of your procedure, please resume regular diet and activity unless otherwise directed. You may experience abdominal distention for several hours that is relieved by the passage of gas. Contact your physician if you have any of the following: fever or chills, severe abdominal pain or excessive amount of bleeding or a large amount when having a bowel movement. Occasional specks of blood with bowel movement would not be unusual. Verbalizes understanding.

## 2025-04-24 NOTE — PROGRESS NOTES
Was speaking with patient when his phone hung up and now goes straight to a mailbox that hasn't been set up. Called wife's number and left message to be here at 1015 for 1143 start time,  requirements and NPO x sip with meds and prep. Left Dr. Cavanaugh office phone number for any questions.

## 2025-04-25 ENCOUNTER — HOSPITAL ENCOUNTER (OUTPATIENT)
Age: 70
Discharge: HOME | End: 2025-04-25
Attending: STUDENT IN AN ORGANIZED HEALTH CARE EDUCATION/TRAINING PROGRAM | Admitting: STUDENT IN AN ORGANIZED HEALTH CARE EDUCATION/TRAINING PROGRAM
Payer: MEDICARE

## 2025-04-25 ENCOUNTER — ANESTHESIA (OUTPATIENT)
Dept: ENDOSCOPY | Age: 70
End: 2025-04-25
Payer: MEDICARE

## 2025-04-25 ENCOUNTER — TELEPHONE (OUTPATIENT)
Dept: GASTROENTEROLOGY | Age: 70
End: 2025-04-25

## 2025-04-25 ENCOUNTER — ANESTHESIA EVENT (OUTPATIENT)
Dept: ENDOSCOPY | Age: 70
End: 2025-04-25
Payer: MEDICARE

## 2025-04-25 VITALS
TEMPERATURE: 98 F | HEIGHT: 67 IN | OXYGEN SATURATION: 96 % | BODY MASS INDEX: 21.5 KG/M2 | DIASTOLIC BLOOD PRESSURE: 60 MMHG | WEIGHT: 137 LBS | SYSTOLIC BLOOD PRESSURE: 125 MMHG | RESPIRATION RATE: 12 BRPM | HEART RATE: 66 BPM

## 2025-04-25 DIAGNOSIS — K52.9 CHRONIC DIARRHEA: ICD-10-CM

## 2025-04-25 DIAGNOSIS — K12.0 APHTHOUS ULCER OF MOUTH: ICD-10-CM

## 2025-04-25 DIAGNOSIS — K63.89 MASS OF COLON: Primary | ICD-10-CM

## 2025-04-25 DIAGNOSIS — R63.4 UNINTENTIONAL WEIGHT LOSS: ICD-10-CM

## 2025-04-25 DIAGNOSIS — D64.9 ANEMIA, UNSPECIFIED TYPE: ICD-10-CM

## 2025-04-25 LAB — CEA SERPL-MCNC: 1.3 NG/ML (ref 0–3.8)

## 2025-04-25 PROCEDURE — 2580000003 HC RX 258

## 2025-04-25 PROCEDURE — 3609012400 HC EGD TRANSORAL BIOPSY SINGLE/MULTIPLE: Performed by: STUDENT IN AN ORGANIZED HEALTH CARE EDUCATION/TRAINING PROGRAM

## 2025-04-25 PROCEDURE — 82378 CARCINOEMBRYONIC ANTIGEN: CPT

## 2025-04-25 PROCEDURE — 88305 TISSUE EXAM BY PATHOLOGIST: CPT

## 2025-04-25 PROCEDURE — 2709999900 HC NON-CHARGEABLE SUPPLY: Performed by: STUDENT IN AN ORGANIZED HEALTH CARE EDUCATION/TRAINING PROGRAM

## 2025-04-25 PROCEDURE — 6360000002 HC RX W HCPCS

## 2025-04-25 PROCEDURE — 3700000000 HC ANESTHESIA ATTENDED CARE: Performed by: STUDENT IN AN ORGANIZED HEALTH CARE EDUCATION/TRAINING PROGRAM

## 2025-04-25 PROCEDURE — 3609008300 HC SIGMOIDOSCOPY W/BIOPSY SINGLE/MULTIPLE: Performed by: STUDENT IN AN ORGANIZED HEALTH CARE EDUCATION/TRAINING PROGRAM

## 2025-04-25 PROCEDURE — 88342 IMHCHEM/IMCYTCHM 1ST ANTB: CPT

## 2025-04-25 PROCEDURE — 3700000001 HC ADD 15 MINUTES (ANESTHESIA): Performed by: STUDENT IN AN ORGANIZED HEALTH CARE EDUCATION/TRAINING PROGRAM

## 2025-04-25 PROCEDURE — 7100000011 HC PHASE II RECOVERY - ADDTL 15 MIN: Performed by: STUDENT IN AN ORGANIZED HEALTH CARE EDUCATION/TRAINING PROGRAM

## 2025-04-25 PROCEDURE — 7100000010 HC PHASE II RECOVERY - FIRST 15 MIN: Performed by: STUDENT IN AN ORGANIZED HEALTH CARE EDUCATION/TRAINING PROGRAM

## 2025-04-25 RX ORDER — LIDOCAINE HYDROCHLORIDE 20 MG/ML
INJECTION, SOLUTION EPIDURAL; INFILTRATION; INTRACAUDAL; PERINEURAL
Status: DISCONTINUED | OUTPATIENT
Start: 2025-04-25 | End: 2025-04-25 | Stop reason: SDUPTHER

## 2025-04-25 RX ORDER — PROPOFOL 10 MG/ML
INJECTION, EMULSION INTRAVENOUS
Status: DISCONTINUED | OUTPATIENT
Start: 2025-04-25 | End: 2025-04-25 | Stop reason: SDUPTHER

## 2025-04-25 RX ORDER — SODIUM CHLORIDE, SODIUM LACTATE, POTASSIUM CHLORIDE, CALCIUM CHLORIDE 600; 310; 30; 20 MG/100ML; MG/100ML; MG/100ML; MG/100ML
INJECTION, SOLUTION INTRAVENOUS
Status: DISCONTINUED | OUTPATIENT
Start: 2025-04-25 | End: 2025-04-25 | Stop reason: SDUPTHER

## 2025-04-25 RX ADMIN — PROPOFOL 60 MG: 10 INJECTION, EMULSION INTRAVENOUS at 11:06

## 2025-04-25 RX ADMIN — PROPOFOL 140 MCG/KG/MIN: 10 INJECTION, EMULSION INTRAVENOUS at 11:07

## 2025-04-25 RX ADMIN — LIDOCAINE HYDROCHLORIDE 100 MG: 20 INJECTION, SOLUTION EPIDURAL; INFILTRATION; INTRACAUDAL; PERINEURAL at 11:06

## 2025-04-25 RX ADMIN — PROPOFOL 20 MG: 10 INJECTION, EMULSION INTRAVENOUS at 11:12

## 2025-04-25 RX ADMIN — SODIUM CHLORIDE, SODIUM LACTATE, POTASSIUM CHLORIDE, AND CALCIUM CHLORIDE: 600; 310; 30; 20 INJECTION, SOLUTION INTRAVENOUS at 11:01

## 2025-04-25 ASSESSMENT — LIFESTYLE VARIABLES: SMOKING_STATUS: 1

## 2025-04-25 ASSESSMENT — PAIN SCALES - GENERAL: PAINLEVEL_OUTOF10: 0

## 2025-04-25 ASSESSMENT — PAIN - FUNCTIONAL ASSESSMENT: PAIN_FUNCTIONAL_ASSESSMENT: NONE - DENIES PAIN

## 2025-04-25 NOTE — ANESTHESIA POSTPROCEDURE EVALUATION
Department of Anesthesiology  Postprocedure Note    Patient: Lei Gracia  MRN: [id number]  YOB: 1955  Date of evaluation: 4/25/2025    Procedure Summary       Date: 04/25/25 Room / Location: [facility] ENDO 04 / [facility] ENDOSCOPY    Anesthesia Start: 1102 Anesthesia Stop: 1137    Procedures:       SIGMOIDOSCOPY BIOPSY FLEXIBLE/ TATTOO      ESOPHAGOGASTRODUODENOSCOPY BIOPSY (Upper GI Region) Diagnosis:       Chronic diarrhea      Anemia, unspecified type      Unintentional weight loss      Aphthous ulcer of mouth      (Chronic diarrhea [K52.9])      (Anemia, unspecified type [D64.9])      (Unintentional weight loss [R63.4])      (Aphthous ulcer of mouth [K12.0])    Surgeons: Lexie Schwab MD Responsible Provider: Ryan Smith MD    Anesthesia Type: TIVA ASA Status: 3            Anesthesia Type: TIVA    Franklin Phase I: Franklin Score: 10    Franklin Phase II: Franklin Score: 8    Anesthesia Post Evaluation    Patient location during evaluation: bedside  Patient participation: complete - patient participated  Level of consciousness: awake  Airway patency: patent  Nausea & Vomiting: no nausea  Cardiovascular status: hemodynamically stable  Respiratory status: acceptable, nonlabored ventilation and spontaneous ventilation  Hydration status: euvolemic    No notable events documented.

## 2025-04-25 NOTE — TELEPHONE ENCOUNTER
Called patient to give Radiology scheduling hours/ number and to schedule follow up appointment with Melissa Grinnell, PA. No answer and vm has not been set up. Called patient's wife (on KASHIF, Libby). No answer. LVM with radiology scheduling for CT. Also advised or referrals that were placed and to be aware. LM with office number to return call for any questions/ concerns or to schedule follow up with Melissa Grinnell, PA

## 2025-04-25 NOTE — DISCHARGE INSTRUCTIONS
Gastrointestinal Esophagogastroduodenoscopy (EGD) - Upper Exam Discharge Instructions    1. Call Dr. Schwab at [phone] for any problems or questions.    2. Contact the doctor's office for follow up appointment as directed.    3. Medication may cause drowsiness for several hours, therefore:  Do not drive or operate machinery for remainder of the day.    No alcohol today.  Do not make any important or legal decisions for 24 hours.  Do not sign any legal documents for 24 hours.    5. Ordinarily, you may resume regular diet and activity after exam unless otherwise specified by your physician.    6. For mild soreness in your throat you may use Cepacol throat lozenges or warm salt-water gargles as needed.        Gastrointestinal Colonoscopy/Flexible Sigmoidoscopy - Lower Exam Discharge Instructions      Because of air put into your colon during exam, you may experience some abdominal distension, relieved by the passage of gas, for several hours.    Contact your physician if you have any of the following:  Excessive amount of bleeding - large amount when having a bowel movement.  Occasional specks of blood with bowel movement would not be unusual.  Severe abdominal pain  Fever or Chills    Polyp Removal - follow these additional instructions   Take Metamucil - 1 tablespoon in juice every morning for 3 days, as needed for constipation.      Any additional instructions:      Resume previous diet.   Continue present medications.   Return to normal activities tomorrow.   Await for pathology letter in the next 1-2 weeks through [facility], if not signed up for [facility], we will mail the results letter to your address.   We will arrange you a follow up visit with Billie in 3-4 weeks ([address] across from the hospital entrance, [address])

## 2025-04-25 NOTE — ANESTHESIA PRE PROCEDURE
Department of Anesthesiology  Preprocedure Note       Name:  Lei Garcia   Age:  70 y.o.  :  1955                                          MRN:  [id number]         Date:  2025      Surgeon: Surgeon(s):  Lexie Schwab MD    Procedure: Procedure(s):  COLORECTAL CANCER SCREENING, NOT HIGH RISK  ESOPHAGOGASTRODUODENOSCOPY    Medications prior to admission:   Prior to Admission medications    Medication Sig Start Date End Date Taking? Authorizing Provider   omeprazole (PRILOSEC) 20 MG delayed release capsule Take 1 capsule by mouth daily   Yes ProviderMarlene MD   aspirin 81 MG chewable tablet Take 1 tablet by mouth daily   Yes Provider, MD Marlene   diazePAM (VALIUM) 5 MG tablet Take 1 tablet by mouth 3 times daily.   Yes ProviderMarlene MD   ipratropium 0.5 mg-albuterol 2.5 mg (DUONEB) 0.5-2.5 (3) MG/3ML SOLN nebulizer solution Inhale 3 mLs into the lungs every 4 hours 10/5/23  Yes Emory Ruffin Jr., MD   Nebulizers (COMPRESSOR/NEBULIZER) MISC Use 4x/d as directed  COPD J44.9 10/5/23  Yes Emory Ruffin Jr., MD   Respiratory Therapy Supplies (NEBULIZER/TUBING/MOUTHPIECE) KIT 1 kit by Does not apply route daily as needed (sob/wheeze) J44.9 10/5/23  Yes Emory Ruffin Jr., MD       Current medications:    No current facility-administered medications for this encounter.       Allergies:  No Known Allergies    Problem List:    Patient Active Problem List   Diagnosis Code   • Chronic diarrhea K52.9   • Anemia D64.9   • Unintentional weight loss R63.4   • Aphthous ulcer of mouth K12.0       Past Medical History:        Diagnosis Date   • Anemia    • COPD (chronic obstructive pulmonary disease) (HCC)    • PAD (peripheral artery disease)        Past Surgical History:        Procedure Laterality Date   • CATARACT EXTRACTION Bilateral        Social History:    Social History     Tobacco Use   • Smoking status: Former     Current packs/day: 0.00     Types: Cigarettes     Quit

## 2025-04-25 NOTE — H&P
evaluated by PCP on 2025. Previously had loose stools but regular bowel movements once daily in the morning. Condition worsened with bowel movements increasing to 2-3 times daily and nocturnal incontinence requiring diapers. Stools are watery, often with bright red blood, especially at night. No abdominal pain, nausea, or vomiting.    Known history of acid reflux, managed with Prilosec. No dysphagia.    Unintentional weight loss since diarrhea onset, despite usual diet. Weight was 148 pounds in 2024 and 2025, now 134 pounds after losing 15 pounds since 2025.    Developed oral sores concurrent with diarrhea onset. Taking B12 supplement, occasionally Tylenol for pain, and [place] for cold symptoms. Consumes Mountain Dew and Gatorade daily. Using Imodium for diarrhea, not regularly.    No chronic cough, shortness of breath, or chest pain. Occasional back pain when lying in bed.    SOCIAL HISTORY  Alcohol: Drinks six beers a week.  Tobacco: Quit smoking in February after 60 years of smoking a pack a day.    FAMILY HISTORY  Brother: Prostate cancer,   Negative for GI cancer (stomach, esophageal, liver, pancreas, colon, rectal)  Negative for Crohn's disease or ulcerative colitis     Past Medical History   History reviewed. No pertinent past medical history.        Past Surgical History   History reviewed. No pertinent surgical history.         Allergies   No Known Allergies         Family History   History reviewed. No pertinent family history.        Current Facility-Administered Medications          Current Outpatient Medications   Medication Sig Dispense Refill    omeprazole (PRILOSEC) 20 MG delayed release capsule Take 1 capsule by mouth daily        aspirin 81 MG chewable tablet Take 1 tablet by mouth daily        diazePAM (VALIUM) 5 MG tablet Take 1 tablet by mouth 3 times daily.        ipratropium (ATROVENT) 0.03 % nasal spray 2 sprays by Each Nostril route in the morning and 2 sprays  in the evening. (Patient not taking: Reported on 4/23/2025)        ipratropium 0.5 mg-albuterol 2.5 mg (DUONEB) 0.5-2.5 (3) MG/3ML SOLN nebulizer solution Inhale 3 mLs into the lungs every 4 hours (Patient not taking: Reported on 4/23/2025) 360 each 3    Nebulizers (COMPRESSOR/NEBULIZER) MISC Use 4x/d as directed  COPD J44.9 (Patient not taking: Reported on 4/23/2025) 1 each 0    Respiratory Therapy Supplies (NEBULIZER/TUBING/MOUTHPIECE) KIT 1 kit by Does not apply route daily as needed (sob/wheeze) J44.9 (Patient not taking: Reported on 4/23/2025) 1 kit 5      No current facility-administered medications for this visit.            Review of Systems  All other systems reviewed and are negative except as noted above.            Objective      Vitals:     04/23/25 1049   BP: (!) 143/83   Pulse: 79   SpO2: 96%         Physical Exam     Physical Exam  General Appearance: no acute distress.  Vital signs: Weight 134 pounds.  HEENT: Dental decay and mouth sores.  Respiratory: normal effort with no respiratory distress.  Cardiovascular: normal rate.  Gastrointestinal: No tenderness.  Skin: dry, no jaundice.  Neurological: alert and oriented.  Psychiatric: mood, behavior, thought content and judgment normal.                    The patient (or guardian, if applicable) and other individuals in attendance with the patient were advised that Artificial Intelligence will be utilized during this visit to record, process the conversation to generate a clinical note, and support improvement of the AI technology. The patient (or guardian, if applicable) and other individuals in attendance at the appointment consented to the use of AI, including the recording.             An electronic signature was used to authenticate this note.

## 2025-04-28 ENCOUNTER — HOSPITAL ENCOUNTER (OUTPATIENT)
Dept: CT IMAGING | Age: 70
Discharge: HOME | End: 2025-05-01
Attending: STUDENT IN AN ORGANIZED HEALTH CARE EDUCATION/TRAINING PROGRAM
Payer: MEDICARE

## 2025-04-28 DIAGNOSIS — K63.89 MASS OF COLON: ICD-10-CM

## 2025-04-28 LAB — CREAT BLD-MCNC: 1.12 MG/DL (ref 0.8–1.5)

## 2025-04-28 PROCEDURE — 74178 CT ABD&PLV WO CNTR FLWD CNTR: CPT

## 2025-04-28 PROCEDURE — 6360000004 HC RX CONTRAST MEDICATION: Performed by: STUDENT IN AN ORGANIZED HEALTH CARE EDUCATION/TRAINING PROGRAM

## 2025-04-28 PROCEDURE — 82565 ASSAY OF CREATININE: CPT

## 2025-04-28 RX ORDER — IOPAMIDOL 755 MG/ML
100 INJECTION, SOLUTION INTRAVASCULAR
Status: COMPLETED | OUTPATIENT
Start: 2025-04-28 | End: 2025-04-28

## 2025-04-28 RX ADMIN — IOPAMIDOL 100 ML: 755 INJECTION, SOLUTION INTRAVENOUS at 14:57

## 2025-04-29 DIAGNOSIS — K63.89 COLONIC MASS: Primary | ICD-10-CM

## 2025-04-29 DIAGNOSIS — C79.9 METASTATIC ADENOCARCINOMA (HCC): ICD-10-CM

## 2025-04-30 ENCOUNTER — CLINICAL DOCUMENTATION (OUTPATIENT)
Dept: CASE MANAGEMENT | Age: 70
End: 2025-04-30

## 2025-04-30 SDOH — ECONOMIC STABILITY: FOOD INSECURITY: WITHIN THE PAST 12 MONTHS, THE FOOD YOU BOUGHT JUST DIDN'T LAST AND YOU DIDN'T HAVE MONEY TO GET MORE.: NEVER TRUE

## 2025-04-30 SDOH — ECONOMIC STABILITY: FOOD INSECURITY: WITHIN THE PAST 12 MONTHS, YOU WORRIED THAT YOUR FOOD WOULD RUN OUT BEFORE YOU GOT MONEY TO BUY MORE.: NEVER TRUE

## 2025-04-30 SDOH — HEALTH STABILITY: PHYSICAL HEALTH: ON AVERAGE, HOW MANY MINUTES DO YOU ENGAGE IN EXERCISE AT THIS LEVEL?: 20 MIN

## 2025-04-30 SDOH — ECONOMIC STABILITY: INCOME INSECURITY: IN THE LAST 12 MONTHS, WAS THERE A TIME WHEN YOU WERE NOT ABLE TO PAY THE MORTGAGE OR RENT ON TIME?: NO

## 2025-04-30 SDOH — ECONOMIC STABILITY: TRANSPORTATION INSECURITY
IN THE PAST 12 MONTHS, HAS LACK OF TRANSPORTATION KEPT YOU FROM MEETINGS, WORK, OR FROM GETTING THINGS NEEDED FOR DAILY LIVING?: NO

## 2025-04-30 SDOH — HEALTH STABILITY: PHYSICAL HEALTH: ON AVERAGE, HOW MANY DAYS PER WEEK DO YOU ENGAGE IN MODERATE TO STRENUOUS EXERCISE (LIKE A BRISK WALK)?: 5 DAYS

## 2025-04-30 SDOH — ECONOMIC STABILITY: TRANSPORTATION INSECURITY
IN THE PAST 12 MONTHS, HAS THE LACK OF TRANSPORTATION KEPT YOU FROM MEDICAL APPOINTMENTS OR FROM GETTING MEDICATIONS?: NO

## 2025-04-30 ASSESSMENT — SOCIAL DETERMINANTS OF HEALTH (SDOH)
HOW OFTEN DO YOU ATTENT MEETINGS OF THE CLUB OR ORGANIZATION YOU BELONG TO?: NEVER
DO YOU BELONG TO ANY CLUBS OR ORGANIZATIONS SUCH AS CHURCH GROUPS UNIONS, FRATERNAL OR ATHLETIC GROUPS, OR SCHOOL GROUPS?: NO
WITHIN THE LAST YEAR, HAVE TO BEEN RAPED OR FORCED TO HAVE ANY KIND OF SEXUAL ACTIVITY BY YOUR PARTNER OR EX-PARTNER?: NO
HOW HARD IS IT FOR YOU TO PAY FOR THE VERY BASICS LIKE FOOD, HOUSING, MEDICAL CARE, AND HEATING?: NOT VERY HARD
WITHIN THE LAST YEAR, HAVE YOU BEEN AFRAID OF YOUR PARTNER OR EX-PARTNER?: NO
HOW OFTEN DO YOU GET TOGETHER WITH FRIENDS OR RELATIVES?: ONCE A WEEK
WITHIN THE LAST YEAR, HAVE YOU BEEN HUMILIATED OR EMOTIONALLY ABUSED IN OTHER WAYS BY YOUR PARTNER OR EX-PARTNER?: NO
IN A TYPICAL WEEK, HOW MANY TIMES DO YOU TALK ON THE PHONE WITH FAMILY, FRIENDS, OR NEIGHBORS?: TWICE A WEEK
HOW OFTEN DO YOU ATTEND CHURCH OR RELIGIOUS SERVICES?: NEVER
WITHIN THE LAST YEAR, HAVE YOU BEEN KICKED, HIT, SLAPPED, OR OTHERWISE PHYSICALLY HURT BY YOUR PARTNER OR EX-PARTNER?: NO

## 2025-04-30 ASSESSMENT — PATIENT HEALTH QUESTIONNAIRE - PHQ9
SUM OF ALL RESPONSES TO PHQ QUESTIONS 1-9: 0
SUM OF ALL RESPONSES TO PHQ QUESTIONS 1-9: 0
2. FEELING DOWN, DEPRESSED OR HOPELESS: NOT AT ALL
SUM OF ALL RESPONSES TO PHQ QUESTIONS 1-9: 0
1. LITTLE INTEREST OR PLEASURE IN DOING THINGS: NOT AT ALL
SUM OF ALL RESPONSES TO PHQ QUESTIONS 1-9: 0

## 2025-04-30 ASSESSMENT — LIFESTYLE VARIABLES
HOW OFTEN DO YOU HAVE A DRINK CONTAINING ALCOHOL: 2-4 TIMES A MONTH
HOW MANY STANDARD DRINKS CONTAINING ALCOHOL DO YOU HAVE ON A TYPICAL DAY: 3 OR 4

## 2025-04-30 NOTE — PROGRESS NOTES
Nurse Navigation outreach related to intake    Navigation Intake 4/30/2025    I reviewed family history of cancer. The role of navigation services have been reviewed and updated.  Referring provider notified of intake and upcoming appointments.    CT A/P on 4-28-25  Patient was scheduled for Ct of chest on 4-29-28 but he states he was unaware of this appt. Spoke with Tasia Zabala RN and she stated to have pt go ahead and attend scheduled visit w Dr Guevara w/o CT of chest.     Current Gastroenterologist: Josselin  Appointment with Gastroenterology: 4-25-25  Appointment with Oncology: 5-1-25  Appointment with Surgery: n/a    My Chart message to patient with navigation contact information and upcoming appointments. Patient does not have SeeWhyhart  Attached link for calendar for Cancer Support in the Community provided by the [facility] with opportunities for programs both in person and virtually.   Attached link for the [facility] Connection for counseling opportunities, support groups, financial assistance, and general physical support.     Navigation Assessment:  The following were identified as potential barriers to care:   1. May need more reinforcement with plan of care         Goal of next outreach: discuss plan of care after oncology consult  Time Spent: 30 mins

## 2025-05-01 ENCOUNTER — OFFICE VISIT (OUTPATIENT)
Dept: ONCOLOGY | Age: 70
End: 2025-05-01
Payer: MEDICARE

## 2025-05-01 ENCOUNTER — HOSPITAL ENCOUNTER (OUTPATIENT)
Dept: LAB | Age: 70
Discharge: HOME | End: 2025-05-01
Payer: MEDICARE

## 2025-05-01 VITALS
DIASTOLIC BLOOD PRESSURE: 69 MMHG | OXYGEN SATURATION: 100 % | HEIGHT: 67 IN | WEIGHT: 133.1 LBS | RESPIRATION RATE: 16 BRPM | SYSTOLIC BLOOD PRESSURE: 115 MMHG | TEMPERATURE: 98 F | BODY MASS INDEX: 20.89 KG/M2 | HEART RATE: 73 BPM

## 2025-05-01 DIAGNOSIS — C18.9 MALIGNANT NEOPLASM OF COLON, UNSPECIFIED PART OF COLON (HCC): Primary | ICD-10-CM

## 2025-05-01 DIAGNOSIS — C18.7 CANCER OF SIGMOID COLON (HCC): ICD-10-CM

## 2025-05-01 LAB
ALBUMIN SERPL-MCNC: 3.6 G/DL (ref 3.2–4.6)
ALBUMIN/GLOB SERPL: 0.9 (ref 1–1.9)
ALP SERPL-CCNC: 80 U/L (ref 40–129)
ALT SERPL-CCNC: 8 U/L (ref 8–55)
ANION GAP SERPL CALC-SCNC: 13 MMOL/L (ref 7–16)
AST SERPL-CCNC: 16 U/L (ref 15–37)
BASOPHILS # BLD: 0.02 K/UL (ref 0–0.2)
BASOPHILS NFR BLD: 0.2 % (ref 0–2)
BILIRUB SERPL-MCNC: 0.3 MG/DL (ref 0–1.2)
BUN SERPL-MCNC: 11 MG/DL (ref 8–23)
CALCIUM SERPL-MCNC: 9.7 MG/DL (ref 8.8–10.2)
CEA SERPL-MCNC: 1.5 NG/ML (ref 0–3.8)
CHLORIDE SERPL-SCNC: 86 MMOL/L (ref 98–107)
CO2 SERPL-SCNC: 27 MMOL/L (ref 20–29)
CREAT SERPL-MCNC: 1.05 MG/DL (ref 0.8–1.3)
DIFFERENTIAL METHOD BLD: ABNORMAL
EOSINOPHIL # BLD: 0.02 K/UL (ref 0–0.8)
EOSINOPHIL NFR BLD: 0.2 % (ref 0.5–7.8)
ERYTHROCYTE [DISTWIDTH] IN BLOOD BY AUTOMATED COUNT: 15.1 % (ref 11.9–14.6)
FERRITIN SERPL-MCNC: 669 NG/ML (ref 8–388)
GLOBULIN SER CALC-MCNC: 3.9 G/DL (ref 2.3–3.5)
GLUCOSE SERPL-MCNC: 102 MG/DL (ref 70–99)
HCT VFR BLD AUTO: 23.1 % (ref 41.1–50.3)
HGB BLD-MCNC: 8 G/DL (ref 13.6–17.2)
IMM GRANULOCYTES # BLD AUTO: 0.08 K/UL (ref 0–0.5)
IMM GRANULOCYTES NFR BLD AUTO: 0.8 % (ref 0–5)
IRON SATN MFR SERPL: 20 % (ref 20–50)
IRON SERPL-MCNC: 50 UG/DL (ref 35–100)
LYMPHOCYTES # BLD: 1.28 K/UL (ref 0.5–4.6)
LYMPHOCYTES NFR BLD: 12.5 % (ref 13–44)
MCH RBC QN AUTO: 39.8 PG (ref 26.1–32.9)
MCHC RBC AUTO-ENTMCNC: 34.6 G/DL (ref 31.4–35)
MCV RBC AUTO: 114.9 FL (ref 82–102)
MONOCYTES # BLD: 1.37 K/UL (ref 0.1–1.3)
MONOCYTES NFR BLD: 13.4 % (ref 4–12)
NEUTS SEG # BLD: 7.48 K/UL (ref 1.7–8.2)
NEUTS SEG NFR BLD: 72.9 % (ref 43–78)
NRBC # BLD: 0 K/UL (ref 0–0.2)
PLATELET # BLD AUTO: 327 K/UL (ref 150–450)
PMV BLD AUTO: 8.9 FL (ref 9.4–12.3)
POTASSIUM SERPL-SCNC: 3.2 MMOL/L (ref 3.5–5.1)
PROT SERPL-MCNC: 7.5 G/DL (ref 6.3–8.2)
RBC # BLD AUTO: 2.01 M/UL (ref 4.23–5.6)
SODIUM SERPL-SCNC: 126 MMOL/L (ref 136–145)
TIBC SERPL-MCNC: 255 UG/DL (ref 240–450)
UIBC SERPL-MCNC: 205 UG/DL (ref 112–347)
WBC # BLD AUTO: 10.3 K/UL (ref 4.3–11.1)

## 2025-05-01 PROCEDURE — 83550 IRON BINDING TEST: CPT

## 2025-05-01 PROCEDURE — 99205 OFFICE O/P NEW HI 60 MIN: CPT | Performed by: INTERNAL MEDICINE

## 2025-05-01 PROCEDURE — 82378 CARCINOEMBRYONIC ANTIGEN: CPT

## 2025-05-01 PROCEDURE — 36415 COLL VENOUS BLD VENIPUNCTURE: CPT

## 2025-05-01 PROCEDURE — 85025 COMPLETE CBC W/AUTO DIFF WBC: CPT

## 2025-05-01 PROCEDURE — 1126F AMNT PAIN NOTED NONE PRSNT: CPT | Performed by: INTERNAL MEDICINE

## 2025-05-01 PROCEDURE — 83540 ASSAY OF IRON: CPT

## 2025-05-01 PROCEDURE — 1123F ACP DISCUSS/DSCN MKR DOCD: CPT | Performed by: INTERNAL MEDICINE

## 2025-05-01 PROCEDURE — 82728 ASSAY OF FERRITIN: CPT

## 2025-05-01 PROCEDURE — 80053 COMPREHEN METABOLIC PANEL: CPT

## 2025-05-01 ASSESSMENT — PATIENT HEALTH QUESTIONNAIRE - PHQ9
SUM OF ALL RESPONSES TO PHQ QUESTIONS 1-9: 0
2. FEELING DOWN, DEPRESSED OR HOPELESS: NOT AT ALL
SUM OF ALL RESPONSES TO PHQ QUESTIONS 1-9: 0
SUM OF ALL RESPONSES TO PHQ QUESTIONS 1-9: 0
1. LITTLE INTEREST OR PLEASURE IN DOING THINGS: NOT AT ALL
SUM OF ALL RESPONSES TO PHQ QUESTIONS 1-9: 0

## 2025-05-01 NOTE — PATIENT INSTRUCTIONS
Patient Information from Today's Visit    The members of your [facility]cology Medical Home are listed below:    Physician Provider: Dr. Ravi Guevara   Advanced Practice Clinician: June Leyva   Registered Nurse: Tasia ELIAS   Nurse Navigator: Radha LU RN  Medical Assistant: Teri ELIAS   : June \"Micaela\" FREDDY.   Supportive Care Services: Maximo DAYOKIKI    Diagnosis (Information Sheet Provided on Day of Diagnosis): colon cancer    Follow Up Instructions:   - labs today  - PET scan to look for cancer elsewhere in your body  - urgent referral to surgery to decide if you need upfront surgery or if we should do chemotherapy first followed by surgery  - either way you will need a port       Has Treatment Plan Been Finalized? N/A     Current Labs:       Please refer to After Visit Summary or OP3Nvoice for upcoming appointment information. Please call our office for rescheduling needs at least 24 hours before your scheduled appointment time.If you have any questions regarding your upcoming schedule please reach out to your care team through OP3Nvoice or call [phone].     Please notify your assigned Nurse Navigator of any unplanned hospital admissions or Emergency Room visits within 24 hours of discharge.    -------------------------------------------------------------------------------------------------------------------  Please call our office at [phone] if you have any  of the following symptoms:   Fever of 100.5 or greater  Chills  Shortness of breath  Swelling or pain in one leg    After office hours an answering service is available and will contact a provider for emergencies or if you are experiencing any of the above symptoms.        Tasia Harris RN

## 2025-05-01 NOTE — PROGRESS NOTES
Insufficiently Active (4/30/2025)    Exercise Vital Sign     Days of Exercise per Week: 5 days     Minutes of Exercise per Session: 20 min   Stress: No Stress Concern Present (4/30/2025)    [place] [employer] - Occupational Stress Questionnaire     Feeling of Stress : Only a little   Social Connections: Moderately Isolated (4/30/2025)    Social Connection and Isolation Panel [NHANES]     Frequency of Communication with Friends and Family: Twice a week     Frequency of Social Gatherings with Friends and Family: Once a week     Attends Baptism Services: Never     Active Member of Clubs or Organizations: No     Attends Club or Organization Meetings: Never     Marital Status:    Intimate Partner Violence: Not At Risk (4/30/2025)    Humiliation, Afraid, Rape, and Kick questionnaire     Fear of Current or Ex-Partner: No     Emotionally Abused: No     Physically Abused: No     Sexually Abused: No   Housing Stability: Low Risk  (4/30/2025)    Housing Stability Vital Sign     Unable to Pay for Housing in the Last Year: No     Number of Times Moved in the Last Year: 0     Homeless in the Last Year: No         No family history on file.      REVIEW OF SYSTEMS:  As mentioned above, all other systems were reviewed in full and are negative.      No Known Allergies        PHYSICAL EXAMINATION:  General Appearance: Healthy appearing patient in no acute distress  Vitals were reviewed.  Ht 1.702 m (5' 7\")   Wt 60.4 kg (133 lb 1.6 oz)   BMI 20.85 kg/m²   HEENT: No oral or pharyngeal masses, ulceration or thrush noted, no sinus tenderness.  Neck is supple with no thyromegaly or JVD noted.  Lymph Nodes: No lymphadenopathy noted in the occipital, pre and post auricular, cervical, supra and infraclavicular, axillary, epitrochlear, inguinal, and popliteal region.  Breasts: No palpable masses, nipple discharge or skin retraction  Lungs/Thorax: Clear to auscultation, no accessory muscles of respiration being

## 2025-05-02 ENCOUNTER — CLINICAL DOCUMENTATION (OUTPATIENT)
Dept: CASE MANAGEMENT | Age: 70
End: 2025-05-02

## 2025-05-02 ENCOUNTER — PREP FOR PROCEDURE (OUTPATIENT)
Dept: SURGERY | Age: 70
End: 2025-05-02

## 2025-05-02 ENCOUNTER — TELEPHONE (OUTPATIENT)
Dept: ONCOLOGY | Age: 70
End: 2025-05-02

## 2025-05-02 DIAGNOSIS — C18.7 CANCER OF SIGMOID COLON (HCC): ICD-10-CM

## 2025-05-02 DIAGNOSIS — C18.9 MALIGNANT NEOPLASM OF COLON, UNSPECIFIED PART OF COLON (HCC): Primary | ICD-10-CM

## 2025-05-02 RX ORDER — ACETAMINOPHEN 325 MG/1
1000 TABLET ORAL ONCE
Status: CANCELLED | OUTPATIENT
Start: 2025-05-02 | End: 2025-05-02

## 2025-05-02 RX ORDER — SODIUM CHLORIDE 0.9 % (FLUSH) 0.9 %
5-40 SYRINGE (ML) INJECTION EVERY 12 HOURS SCHEDULED
Status: CANCELLED | OUTPATIENT
Start: 2025-05-02

## 2025-05-02 RX ORDER — POTASSIUM CHLORIDE 1500 MG/1
20 TABLET, EXTENDED RELEASE ORAL DAILY
Qty: 30 TABLET | Refills: 0 | Status: SHIPPED | OUTPATIENT
Start: 2025-05-02

## 2025-05-02 RX ORDER — SODIUM CHLORIDE 9 MG/ML
INJECTION, SOLUTION INTRAVENOUS PRN
Status: CANCELLED | OUTPATIENT
Start: 2025-05-02

## 2025-05-02 RX ORDER — SODIUM CHLORIDE 0.9 % (FLUSH) 0.9 %
5-40 SYRINGE (ML) INJECTION PRN
Status: CANCELLED | OUTPATIENT
Start: 2025-05-02

## 2025-05-02 NOTE — TELEPHONE ENCOUNTER
Labs reviewed by Dr. Guevara. New order for potassium 20 meq daily. Also to instruct to increase fluid intake due to sodium level and repeat bmp next week. Patient notified. He will get his labs done after his visit with Dr. Roper on 5/6 at [facility] walk in lab.    Verbal order for potassium 20 meq daily and bmp received from Dr. Guevara with verbal read back to confirm. Order signed and routed to provider for co signature.

## 2025-05-02 NOTE — PROGRESS NOTES
Nurse Navigation outreach related to medical oncology consult visit. Pt verbalizes understanding of all details discussed in office visit with Dr Guevara. He also states Dr Roper's office called and stated he plans on taking pt to surgery on 5-9-25.     Nurse navigation reviewed additional testing. Oncology Medical Home information and education on how to contact our office was provided via verbally and via [email or web address].       Goal of next outreach: Follow up w pt and see if pt is scheduled for surgery and chg PET and Dr Guevara date if needed  Time Spent: 30 mins

## 2025-05-06 ENCOUNTER — HOSPITAL ENCOUNTER (OUTPATIENT)
Dept: GENERAL RADIOLOGY | Age: 70
Discharge: HOME | End: 2025-05-09
Payer: MEDICARE

## 2025-05-06 ENCOUNTER — HOSPITAL ENCOUNTER (OUTPATIENT)
Dept: SURGERY | Age: 70
Discharge: HOME | End: 2025-05-09
Payer: MEDICARE

## 2025-05-06 ENCOUNTER — HOSPITAL ENCOUNTER (OUTPATIENT)
Dept: CT IMAGING | Age: 70
Discharge: HOME | End: 2025-05-09
Payer: MEDICARE

## 2025-05-06 ENCOUNTER — OFFICE VISIT (OUTPATIENT)
Dept: SURGERY | Age: 70
End: 2025-05-06
Payer: MEDICARE

## 2025-05-06 VITALS
OXYGEN SATURATION: 96 % | TEMPERATURE: 98.2 F | SYSTOLIC BLOOD PRESSURE: 145 MMHG | WEIGHT: 131.3 LBS | HEART RATE: 81 BPM | RESPIRATION RATE: 18 BRPM | BODY MASS INDEX: 20.61 KG/M2 | HEIGHT: 67 IN | DIASTOLIC BLOOD PRESSURE: 86 MMHG

## 2025-05-06 VITALS
SYSTOLIC BLOOD PRESSURE: 140 MMHG | BODY MASS INDEX: 20.56 KG/M2 | DIASTOLIC BLOOD PRESSURE: 78 MMHG | WEIGHT: 131 LBS | HEIGHT: 67 IN

## 2025-05-06 DIAGNOSIS — C18.7 CANCER OF SIGMOID COLON (HCC): ICD-10-CM

## 2025-05-06 DIAGNOSIS — C18.9 MALIGNANT NEOPLASM OF COLON, UNSPECIFIED PART OF COLON (HCC): ICD-10-CM

## 2025-05-06 DIAGNOSIS — C18.7 MALIGNANT NEOPLASM OF SIGMOID COLON (HCC): ICD-10-CM

## 2025-05-06 DIAGNOSIS — K63.89 COLONIC MASS: Primary | ICD-10-CM

## 2025-05-06 DIAGNOSIS — K63.89 COLONIC MASS: ICD-10-CM

## 2025-05-06 LAB
ALBUMIN SERPL-MCNC: 3.6 G/DL (ref 3.2–4.6)
ALBUMIN/GLOB SERPL: 0.9 (ref 1–1.9)
ALP SERPL-CCNC: 82 U/L (ref 40–129)
ALT SERPL-CCNC: 7 U/L (ref 8–55)
ANION GAP SERPL CALC-SCNC: 13 MMOL/L (ref 7–16)
AST SERPL-CCNC: 15 U/L (ref 15–37)
BASOPHILS # BLD: 0.01 K/UL (ref 0–0.2)
BASOPHILS NFR BLD: 0.1 % (ref 0–2)
BILIRUB SERPL-MCNC: 0.4 MG/DL (ref 0–1.2)
BUN SERPL-MCNC: 10 MG/DL (ref 8–23)
CALCIUM SERPL-MCNC: 9.4 MG/DL (ref 8.8–10.2)
CEA SERPL-MCNC: 1.4 NG/ML (ref 0–3.8)
CHLORIDE SERPL-SCNC: 89 MMOL/L (ref 98–107)
CO2 SERPL-SCNC: 26 MMOL/L (ref 20–29)
CREAT SERPL-MCNC: 1 MG/DL (ref 0.8–1.3)
DIFFERENTIAL METHOD BLD: ABNORMAL
EKG ATRIAL RATE: 74 BPM
EKG DIAGNOSIS: NORMAL
EKG P AXIS: 83 DEGREES
EKG P-R INTERVAL: 168 MS
EKG Q-T INTERVAL: 376 MS
EKG QRS DURATION: 86 MS
EKG QTC CALCULATION (BAZETT): 417 MS
EKG R AXIS: 78 DEGREES
EKG T AXIS: 76 DEGREES
EKG VENTRICULAR RATE: 74 BPM
EOSINOPHIL # BLD: 0.01 K/UL (ref 0–0.8)
EOSINOPHIL NFR BLD: 0.1 % (ref 0.5–7.8)
ERYTHROCYTE [DISTWIDTH] IN BLOOD BY AUTOMATED COUNT: 15.6 % (ref 11.9–14.6)
GLOBULIN SER CALC-MCNC: 4 G/DL (ref 2.3–3.5)
GLUCOSE SERPL-MCNC: 106 MG/DL (ref 70–99)
HCT VFR BLD AUTO: 22.8 % (ref 41.1–50.3)
HGB BLD-MCNC: 7.6 G/DL (ref 13.6–17.2)
IMM GRANULOCYTES # BLD AUTO: 0.05 K/UL (ref 0–0.5)
IMM GRANULOCYTES NFR BLD AUTO: 0.6 % (ref 0–5)
INR PPP: 1
LYMPHOCYTES # BLD: 1.02 K/UL (ref 0.5–4.6)
LYMPHOCYTES NFR BLD: 13.2 % (ref 13–44)
MCH RBC QN AUTO: 38.8 PG (ref 26.1–32.9)
MCHC RBC AUTO-ENTMCNC: 33.3 G/DL (ref 31.4–35)
MCV RBC AUTO: 116.3 FL (ref 82–102)
MONOCYTES # BLD: 1.01 K/UL (ref 0.1–1.3)
MONOCYTES NFR BLD: 13.1 % (ref 4–12)
NEUTS SEG # BLD: 5.61 K/UL (ref 1.7–8.2)
NEUTS SEG NFR BLD: 72.9 % (ref 43–78)
NRBC # BLD: 0 K/UL (ref 0–0.2)
PLATELET # BLD AUTO: 278 K/UL (ref 150–450)
PMV BLD AUTO: 9.1 FL (ref 9.4–12.3)
POTASSIUM SERPL-SCNC: 3.4 MMOL/L (ref 3.5–5.1)
PROT SERPL-MCNC: 7.6 G/DL (ref 6.3–8.2)
PROTHROMBIN TIME: 13.2 SEC (ref 11.3–14.9)
RBC # BLD AUTO: 1.96 M/UL (ref 4.23–5.6)
SODIUM SERPL-SCNC: 128 MMOL/L (ref 136–145)
WBC # BLD AUTO: 7.7 K/UL (ref 4.3–11.1)

## 2025-05-06 PROCEDURE — 71046 X-RAY EXAM CHEST 2 VIEWS: CPT

## 2025-05-06 PROCEDURE — 85025 COMPLETE CBC W/AUTO DIFF WBC: CPT

## 2025-05-06 PROCEDURE — 1159F MED LIST DOCD IN RCRD: CPT | Performed by: SURGERY

## 2025-05-06 PROCEDURE — 99205 OFFICE O/P NEW HI 60 MIN: CPT | Performed by: SURGERY

## 2025-05-06 PROCEDURE — 82378 CARCINOEMBRYONIC ANTIGEN: CPT

## 2025-05-06 PROCEDURE — 1160F RVW MEDS BY RX/DR IN RCRD: CPT | Performed by: SURGERY

## 2025-05-06 PROCEDURE — 80053 COMPREHEN METABOLIC PANEL: CPT

## 2025-05-06 PROCEDURE — 1123F ACP DISCUSS/DSCN MKR DOCD: CPT | Performed by: SURGERY

## 2025-05-06 PROCEDURE — 93005 ELECTROCARDIOGRAM TRACING: CPT

## 2025-05-06 PROCEDURE — 71260 CT THORAX DX C+: CPT

## 2025-05-06 PROCEDURE — 93010 ELECTROCARDIOGRAM REPORT: CPT | Performed by: INTERNAL MEDICINE

## 2025-05-06 PROCEDURE — 6360000004 HC RX CONTRAST MEDICATION: Performed by: PHYSICIAN ASSISTANT

## 2025-05-06 PROCEDURE — 85610 PROTHROMBIN TIME: CPT

## 2025-05-06 RX ORDER — IOPAMIDOL 755 MG/ML
100 INJECTION, SOLUTION INTRAVASCULAR
Status: COMPLETED | OUTPATIENT
Start: 2025-05-06 | End: 2025-05-06

## 2025-05-06 RX ADMIN — IOPAMIDOL 100 ML: 755 INJECTION, SOLUTION INTRAVENOUS at 15:08

## 2025-05-06 NOTE — PERIOP NOTE
Labs within anesthesia guidelines except for hgb 7.6, sent to anesthesia for review. Labs automatically routed to ordering provider via Epic documentation.    
Preoperative Nutrition Screen (LIANNA)   Patient's Age: 70 y.o.    Last Serum Albumin Level:3.6 05/01/2025  Lab Results   Component Value Date    ALBUMIN 3.6 05/01/2025    ALBUMIN 3.5 03/11/2025    ALBUMIN 3.5 12/23/2024       Patient's BMI: Estimated body mass index is 20.56 kg/m² as calculated from the following:    Height as of this encounter: 1.702 m (5' 7\").    Weight as of this encounter: 59.6 kg (131 lb 4.8 oz).     If the answer is yes (LIANNA > 1) to any of the following, then recommend prescribed Oral Nutrition Supplements (ONS) for 5 days prior to surgery and in addition, if LIANNA > 3, order referral to dietitian for further assessment and nutrition therapy.     1. Does the patient have a documented serum albumin less than 3.0 within the last 90 days?    no   2. Is patient's BMI less than 18.5 (or less than 20 if age over 65)?  No = 0         3. Has the patient had an unplanned weight loss of 10% of body weight or more in the last 6 months? No = 0   4. Has the patient been eating less than 50% of their normal diet in the preceding week? Yes = 1   LIANNA Score (number of Yes responses), 0-4 1     Plan:   Ordered Oral Nutrition Supplement for at least 5 days prior to surgery     Electronically signed by Sary De La Cruz RN on 5/6/25 at 1:46 PM EDT        
home medications, please give them to your nurse as they will be administered by the nursing staff.    If you have any questions, please call [facility] [phone].    A copy of this note was provided to the patient for reference.     
surgery?  You will get medicine so that you relax and don't feel pain during your surgery. The surgical team will closely watch your fluid levels during the surgery to keep you well hydrated.  What can you expect after surgery?  You will drink water and eat healthy foods as soon as you can after surgery. Your care team will urge you to sit up in a chair while you eat.  The doctor or nurse will encourage you to get up and walk as soon as you can. The more you can move, or at least sit up in a chair, the better.  Your doctor may give you a shot of medicine. This is to block the pain from the affected area of your body. You may also be given medicine like acetaminophen or ibuprofen. If opioids are used, your doctor will give you the lowest dose for the shortest possible time. Opioids can make your recovery harder. And they can be less safe than other medicines.  Follow-up care is a key part of your treatment and safety. Be sure to make and go to all appointments, and call your doctor if you are having problems. It's also a good idea to know your test results and keep a list of the medicines you take.      Current as of: July 26, 2023  Content Version: 14.2  © 2024 [employer].   Care instructions adapted under license by [employer]. If you have questions about a medical condition or this instruction, always ask your healthcare professional. [employer] disclaims any warranty or liability for your use of this information.

## 2025-05-07 ENCOUNTER — TELEPHONE (OUTPATIENT)
Dept: ONCOLOGY | Age: 70
End: 2025-05-07

## 2025-05-07 NOTE — TELEPHONE ENCOUNTER
Patient scheduled for surgery 5/9. CT chest ordered/completed with Dr. Roper. Per Dr. Guevara, will cancel PET scan and will see patient back a few weeks after surgery. Patient notified.

## 2025-05-07 NOTE — PROGRESS NOTES
CEA  Order: [id number]   Status: Final result       Next appt: 05/15/2025 at 09:00 AM in Radiology ([facility] PET/CT INJ RM)       Dx: Malignant neoplasm of colon, unspecif...    Test Result Released: Yes (not seen)    0 Result Notes        Component  Ref Range & Units (hover) 5/6/25 1425 5/1/25 1451 4/25/25 1200   CEA 1.4 1.5 CM 1.3 CM   Comment: Nonsmoker: <3.9 ng/mL  Smoker: <5.6 ng/mL  Roche ECLIA methodology  Patient's results of tumor marker testing may not be comparable to labs using different manufacturers/methods.   Resulting Agency [facility]             Specimen Collected: 05/06/25 14:25 EDT Last Resulted: 05/06/25 17:40 EDT

## 2025-05-08 ENCOUNTER — ANESTHESIA EVENT (OUTPATIENT)
Dept: SURGERY | Age: 70
End: 2025-05-08
Payer: MEDICARE

## 2025-05-09 ENCOUNTER — ANESTHESIA (OUTPATIENT)
Dept: SURGERY | Age: 70
End: 2025-05-09
Payer: MEDICARE

## 2025-05-09 ENCOUNTER — HOSPITAL ENCOUNTER (INPATIENT)
Age: 70
LOS: 3 days | Discharge: HOME | DRG: 330 | End: 2025-05-12
Attending: SURGERY | Admitting: SURGERY
Payer: MEDICARE

## 2025-05-09 DIAGNOSIS — C18.7 CANCER OF SIGMOID COLON (HCC): Primary | ICD-10-CM

## 2025-05-09 LAB
ANION GAP BLD CALC-SCNC: ABNORMAL MMOL/L
ANION GAP BLD CALC-SCNC: ABNORMAL MMOL/L
BASE DEFICIT BLD-SCNC: 2 MMOL/L
BASE DEFICIT BLD-SCNC: 4 MMOL/L
CA-I BLD-MCNC: 1.09 MMOL/L (ref 1.12–1.32)
CA-I BLD-MCNC: 1.1 MMOL/L (ref 1.12–1.32)
CO2 BLD-SCNC: 20 MMOL/L (ref 13–23)
CO2 BLD-SCNC: 22 MMOL/L (ref 13–23)
GLUCOSE BLD STRIP.AUTO-MCNC: 211 MG/DL (ref 65–100)
GLUCOSE BLD STRIP.AUTO-MCNC: 217 MG/DL (ref 65–100)
HCO3 BLD-SCNC: 21.1 MMOL/L (ref 21–28)
HCO3 BLD-SCNC: 23.2 MMOL/L (ref 21–28)
HISTORY CHECK: NORMAL
PCO2 BLD: 37.6 MMHG (ref 35–45)
PCO2 BLD: 40.3 MMHG (ref 35–45)
PH BLD: 7.36 (ref 7.35–7.45)
PH BLD: 7.37 (ref 7.35–7.45)
PO2 BLD: 318 MMHG (ref 75–100)
PO2 BLD: 496 MMHG (ref 75–100)
POTASSIUM BLD-SCNC: 3.1 MMOL/L (ref 3.5–5.1)
POTASSIUM BLD-SCNC: 3.1 MMOL/L (ref 3.5–5.1)
SAO2 % BLD: 100 %
SAO2 % BLD: 100 %
SERVICE CMNT-IMP: ABNORMAL
SERVICE CMNT-IMP: ABNORMAL
SODIUM BLD-SCNC: 132 MMOL/L (ref 136–145)
SODIUM BLD-SCNC: 132 MMOL/L (ref 136–145)
SPECIMEN SITE: ABNORMAL
SPECIMEN SITE: ABNORMAL

## 2025-05-09 PROCEDURE — 88302 TISSUE EXAM BY PATHOLOGIST: CPT

## 2025-05-09 PROCEDURE — 6360000002 HC RX W HCPCS: Performed by: NURSE ANESTHETIST, CERTIFIED REGISTERED

## 2025-05-09 PROCEDURE — 2580000003 HC RX 258: Performed by: NURSE ANESTHETIST, CERTIFIED REGISTERED

## 2025-05-09 PROCEDURE — 6370000000 HC RX 637 (ALT 250 FOR IP): Performed by: PHYSICIAN ASSISTANT

## 2025-05-09 PROCEDURE — 86850 RBC ANTIBODY SCREEN: CPT

## 2025-05-09 PROCEDURE — 86901 BLOOD TYPING SEROLOGIC RH(D): CPT

## 2025-05-09 PROCEDURE — 0DTJ4ZZ RESECTION OF APPENDIX, PERCUTANEOUS ENDOSCOPIC APPROACH: ICD-10-PCS | Performed by: SURGERY

## 2025-05-09 PROCEDURE — 84132 ASSAY OF SERUM POTASSIUM: CPT

## 2025-05-09 PROCEDURE — 1100000000 HC RM PRIVATE

## 2025-05-09 PROCEDURE — 64488 TAP BLOCK BI INJECTION: CPT | Performed by: ANESTHESIOLOGY

## 2025-05-09 PROCEDURE — 82330 ASSAY OF CALCIUM: CPT

## 2025-05-09 PROCEDURE — 82947 ASSAY GLUCOSE BLOOD QUANT: CPT

## 2025-05-09 PROCEDURE — 94760 N-INVAS EAR/PLS OXIMETRY 1: CPT

## 2025-05-09 PROCEDURE — 2720000010 HC SURG SUPPLY STERILE: Performed by: SURGERY

## 2025-05-09 PROCEDURE — P9045 ALBUMIN (HUMAN), 5%, 250 ML: HCPCS | Performed by: NURSE ANESTHETIST, CERTIFIED REGISTERED

## 2025-05-09 PROCEDURE — 7100000001 HC PACU RECOVERY - ADDTL 15 MIN: Performed by: SURGERY

## 2025-05-09 PROCEDURE — 0DBP8ZZ EXCISION OF RECTUM, VIA NATURAL OR ARTIFICIAL OPENING ENDOSCOPIC: ICD-10-PCS | Performed by: SURGERY

## 2025-05-09 PROCEDURE — 2500000003 HC RX 250 WO HCPCS: Performed by: PHYSICIAN ASSISTANT

## 2025-05-09 PROCEDURE — 2500000003 HC RX 250 WO HCPCS: Performed by: ANESTHESIOLOGY

## 2025-05-09 PROCEDURE — 86923 COMPATIBILITY TEST ELECTRIC: CPT

## 2025-05-09 PROCEDURE — 0DTN0ZZ RESECTION OF SIGMOID COLON, OPEN APPROACH: ICD-10-PCS | Performed by: SURGERY

## 2025-05-09 PROCEDURE — 6370000000 HC RX 637 (ALT 250 FOR IP): Performed by: ANESTHESIOLOGY

## 2025-05-09 PROCEDURE — 2709999900 HC NON-CHARGEABLE SUPPLY: Performed by: SURGERY

## 2025-05-09 PROCEDURE — 86900 BLOOD TYPING SEROLOGIC ABO: CPT

## 2025-05-09 PROCEDURE — 84295 ASSAY OF SERUM SODIUM: CPT

## 2025-05-09 PROCEDURE — 3600000014 HC SURGERY LEVEL 4 ADDTL 15MIN: Performed by: SURGERY

## 2025-05-09 PROCEDURE — 2700000000 HC OXYGEN THERAPY PER DAY

## 2025-05-09 PROCEDURE — 6370000000 HC RX 637 (ALT 250 FOR IP): Performed by: SURGERY

## 2025-05-09 PROCEDURE — 6360000002 HC RX W HCPCS: Performed by: PHYSICIAN ASSISTANT

## 2025-05-09 PROCEDURE — 3E0T3BZ INTRODUCTION OF ANESTHETIC AGENT INTO PERIPHERAL NERVES AND PLEXI, PERCUTANEOUS APPROACH: ICD-10-PCS | Performed by: ANESTHESIOLOGY

## 2025-05-09 PROCEDURE — 3600000004 HC SURGERY LEVEL 4 BASE: Performed by: SURGERY

## 2025-05-09 PROCEDURE — 6360000002 HC RX W HCPCS: Performed by: ANESTHESIOLOGY

## 2025-05-09 PROCEDURE — 7100000000 HC PACU RECOVERY - FIRST 15 MIN: Performed by: SURGERY

## 2025-05-09 PROCEDURE — 88307 TISSUE EXAM BY PATHOLOGIST: CPT

## 2025-05-09 PROCEDURE — 3700000000 HC ANESTHESIA ATTENDED CARE: Performed by: SURGERY

## 2025-05-09 PROCEDURE — 30233N1 TRANSFUSION OF NONAUTOLOGOUS RED BLOOD CELLS INTO PERIPHERAL VEIN, PERCUTANEOUS APPROACH: ICD-10-PCS | Performed by: SURGERY

## 2025-05-09 PROCEDURE — 2500000003 HC RX 250 WO HCPCS: Performed by: NURSE ANESTHETIST, CERTIFIED REGISTERED

## 2025-05-09 PROCEDURE — 2580000003 HC RX 258: Performed by: PHYSICIAN ASSISTANT

## 2025-05-09 PROCEDURE — 2580000003 HC RX 258: Performed by: ANESTHESIOLOGY

## 2025-05-09 PROCEDURE — 88305 TISSUE EXAM BY PATHOLOGIST: CPT

## 2025-05-09 PROCEDURE — 3700000001 HC ADD 15 MINUTES (ANESTHESIA): Performed by: SURGERY

## 2025-05-09 PROCEDURE — 82803 BLOOD GASES ANY COMBINATION: CPT

## 2025-05-09 PROCEDURE — P9016 RBC LEUKOCYTES REDUCED: HCPCS

## 2025-05-09 RX ORDER — LIDOCAINE HYDROCHLORIDE 10 MG/ML
1 INJECTION, SOLUTION INFILTRATION; PERINEURAL
Status: DISCONTINUED | OUTPATIENT
Start: 2025-05-09 | End: 2025-05-09 | Stop reason: HOSPADM

## 2025-05-09 RX ORDER — SODIUM CHLORIDE 9 MG/ML
INJECTION, SOLUTION INTRAVENOUS
Status: DISCONTINUED | OUTPATIENT
Start: 2025-05-09 | End: 2025-05-09 | Stop reason: SDUPTHER

## 2025-05-09 RX ORDER — ONDANSETRON 2 MG/ML
INJECTION INTRAMUSCULAR; INTRAVENOUS
Status: DISCONTINUED | OUTPATIENT
Start: 2025-05-09 | End: 2025-05-09 | Stop reason: SDUPTHER

## 2025-05-09 RX ORDER — LIDOCAINE HYDROCHLORIDE 20 MG/ML
INJECTION, SOLUTION EPIDURAL; INFILTRATION; INTRACAUDAL; PERINEURAL
Status: DISCONTINUED | OUTPATIENT
Start: 2025-05-09 | End: 2025-05-09 | Stop reason: SDUPTHER

## 2025-05-09 RX ORDER — METRONIDAZOLE 500 MG/100ML
500 INJECTION, SOLUTION INTRAVENOUS
Status: DISCONTINUED | OUTPATIENT
Start: 2025-05-09 | End: 2025-05-09 | Stop reason: HOSPADM

## 2025-05-09 RX ORDER — METRONIDAZOLE 500 MG/100ML
500 INJECTION, SOLUTION INTRAVENOUS EVERY 8 HOURS
Status: COMPLETED | OUTPATIENT
Start: 2025-05-09 | End: 2025-05-10

## 2025-05-09 RX ORDER — LIDOCAINE HYDROCHLORIDE ANHYDROUS AND DEXTROSE MONOHYDRATE 5; 400 G/100ML; MG/100ML
INJECTION, SOLUTION INTRAVENOUS
Status: DISCONTINUED | OUTPATIENT
Start: 2025-05-09 | End: 2025-05-09 | Stop reason: SDUPTHER

## 2025-05-09 RX ORDER — SODIUM CHLORIDE, SODIUM LACTATE, POTASSIUM CHLORIDE, CALCIUM CHLORIDE 600; 310; 30; 20 MG/100ML; MG/100ML; MG/100ML; MG/100ML
INJECTION, SOLUTION INTRAVENOUS CONTINUOUS
Status: DISCONTINUED | OUTPATIENT
Start: 2025-05-09 | End: 2025-05-09 | Stop reason: HOSPADM

## 2025-05-09 RX ORDER — SODIUM CHLORIDE 0.9 % (FLUSH) 0.9 %
5-40 SYRINGE (ML) INJECTION EVERY 12 HOURS SCHEDULED
Status: DISCONTINUED | OUTPATIENT
Start: 2025-05-09 | End: 2025-05-09 | Stop reason: HOSPADM

## 2025-05-09 RX ORDER — PROPOFOL 10 MG/ML
INJECTION, EMULSION INTRAVENOUS
Status: DISCONTINUED | OUTPATIENT
Start: 2025-05-09 | End: 2025-05-09 | Stop reason: SDUPTHER

## 2025-05-09 RX ORDER — LIDOCAINE HYDROCHLORIDE ANHYDROUS AND DEXTROSE MONOHYDRATE 5; 400 G/100ML; MG/100ML
1 INJECTION, SOLUTION INTRAVENOUS CONTINUOUS
Status: ACTIVE | OUTPATIENT
Start: 2025-05-09 | End: 2025-05-10

## 2025-05-09 RX ORDER — OXYCODONE HYDROCHLORIDE 5 MG/1
5 TABLET ORAL
Status: DISCONTINUED | OUTPATIENT
Start: 2025-05-09 | End: 2025-05-09 | Stop reason: HOSPADM

## 2025-05-09 RX ORDER — DIAZEPAM 5 MG/1
5 TABLET ORAL EVERY 8 HOURS PRN
Status: DISCONTINUED | OUTPATIENT
Start: 2025-05-09 | End: 2025-05-12 | Stop reason: HOSPADM

## 2025-05-09 RX ORDER — SODIUM CHLORIDE 0.9 % (FLUSH) 0.9 %
5-40 SYRINGE (ML) INJECTION PRN
Status: DISCONTINUED | OUTPATIENT
Start: 2025-05-09 | End: 2025-05-09 | Stop reason: HOSPADM

## 2025-05-09 RX ORDER — PANTOPRAZOLE SODIUM 40 MG/1
40 TABLET, DELAYED RELEASE ORAL
Status: DISCONTINUED | OUTPATIENT
Start: 2025-05-10 | End: 2025-05-12 | Stop reason: HOSPADM

## 2025-05-09 RX ORDER — ONDANSETRON 2 MG/ML
4 INJECTION INTRAMUSCULAR; INTRAVENOUS
Status: DISCONTINUED | OUTPATIENT
Start: 2025-05-09 | End: 2025-05-09 | Stop reason: HOSPADM

## 2025-05-09 RX ORDER — DEXTROSE MONOHYDRATE 100 MG/ML
INJECTION, SOLUTION INTRAVENOUS CONTINUOUS PRN
Status: DISCONTINUED | OUTPATIENT
Start: 2025-05-09 | End: 2025-05-09 | Stop reason: HOSPADM

## 2025-05-09 RX ORDER — SODIUM CHLORIDE 0.9 % (FLUSH) 0.9 %
5-40 SYRINGE (ML) INJECTION EVERY 12 HOURS SCHEDULED
Status: DISCONTINUED | OUTPATIENT
Start: 2025-05-09 | End: 2025-05-12 | Stop reason: HOSPADM

## 2025-05-09 RX ORDER — GABAPENTIN 300 MG/1
300 CAPSULE ORAL 3 TIMES DAILY
Status: DISCONTINUED | OUTPATIENT
Start: 2025-05-09 | End: 2025-05-12 | Stop reason: HOSPADM

## 2025-05-09 RX ORDER — ALBUMIN HUMAN 50 G/1000ML
SOLUTION INTRAVENOUS
Status: DISCONTINUED | OUTPATIENT
Start: 2025-05-09 | End: 2025-05-09 | Stop reason: SDUPTHER

## 2025-05-09 RX ORDER — OXYCODONE HYDROCHLORIDE 5 MG/1
5 TABLET ORAL EVERY 4 HOURS PRN
Status: DISCONTINUED | OUTPATIENT
Start: 2025-05-09 | End: 2025-05-12 | Stop reason: HOSPADM

## 2025-05-09 RX ORDER — PROCHLORPERAZINE EDISYLATE 5 MG/ML
5 INJECTION INTRAMUSCULAR; INTRAVENOUS
Status: DISCONTINUED | OUTPATIENT
Start: 2025-05-09 | End: 2025-05-09 | Stop reason: HOSPADM

## 2025-05-09 RX ORDER — NALOXONE HYDROCHLORIDE 0.4 MG/ML
INJECTION, SOLUTION INTRAMUSCULAR; INTRAVENOUS; SUBCUTANEOUS PRN
Status: DISCONTINUED | OUTPATIENT
Start: 2025-05-09 | End: 2025-05-09 | Stop reason: HOSPADM

## 2025-05-09 RX ORDER — MIDAZOLAM HYDROCHLORIDE 1 MG/ML
INJECTION, SOLUTION INTRAMUSCULAR; INTRAVENOUS
Status: DISCONTINUED | OUTPATIENT
Start: 2025-05-09 | End: 2025-05-09 | Stop reason: SDUPTHER

## 2025-05-09 RX ORDER — KETAMINE HCL IN NACL, ISO-OSM 20 MG/2 ML
SYRINGE (ML) INJECTION
Status: DISCONTINUED | OUTPATIENT
Start: 2025-05-09 | End: 2025-05-09 | Stop reason: SDUPTHER

## 2025-05-09 RX ORDER — SODIUM CHLORIDE, SODIUM LACTATE, POTASSIUM CHLORIDE, CALCIUM CHLORIDE 600; 310; 30; 20 MG/100ML; MG/100ML; MG/100ML; MG/100ML
INJECTION, SOLUTION INTRAVENOUS CONTINUOUS
Status: ACTIVE | OUTPATIENT
Start: 2025-05-09 | End: 2025-05-10

## 2025-05-09 RX ORDER — MIDAZOLAM HYDROCHLORIDE 2 MG/2ML
2 INJECTION, SOLUTION INTRAMUSCULAR; INTRAVENOUS
Status: DISCONTINUED | OUTPATIENT
Start: 2025-05-09 | End: 2025-05-09 | Stop reason: HOSPADM

## 2025-05-09 RX ORDER — OXYCODONE HYDROCHLORIDE 5 MG/1
10 TABLET ORAL EVERY 4 HOURS PRN
Status: DISCONTINUED | OUTPATIENT
Start: 2025-05-09 | End: 2025-05-12 | Stop reason: HOSPADM

## 2025-05-09 RX ORDER — ONDANSETRON 2 MG/ML
4 INJECTION INTRAMUSCULAR; INTRAVENOUS EVERY 6 HOURS PRN
Status: DISCONTINUED | OUTPATIENT
Start: 2025-05-09 | End: 2025-05-12 | Stop reason: HOSPADM

## 2025-05-09 RX ORDER — IBUPROFEN 600 MG/1
1 TABLET ORAL PRN
Status: DISCONTINUED | OUTPATIENT
Start: 2025-05-09 | End: 2025-05-09 | Stop reason: HOSPADM

## 2025-05-09 RX ORDER — HYDROMORPHONE HYDROCHLORIDE 1 MG/ML
1 INJECTION, SOLUTION INTRAMUSCULAR; INTRAVENOUS; SUBCUTANEOUS
Status: DISCONTINUED | OUTPATIENT
Start: 2025-05-09 | End: 2025-05-12 | Stop reason: HOSPADM

## 2025-05-09 RX ORDER — ACETAMINOPHEN 500 MG
1000 TABLET ORAL ONCE
Status: DISCONTINUED | OUTPATIENT
Start: 2025-05-09 | End: 2025-05-09 | Stop reason: SDUPTHER

## 2025-05-09 RX ORDER — DOCUSATE SODIUM 100 MG/1
100 CAPSULE, LIQUID FILLED ORAL 2 TIMES DAILY
Status: DISCONTINUED | OUTPATIENT
Start: 2025-05-09 | End: 2025-05-12 | Stop reason: HOSPADM

## 2025-05-09 RX ORDER — ACETAMINOPHEN 500 MG
1000 TABLET ORAL ONCE
Status: COMPLETED | OUTPATIENT
Start: 2025-05-09 | End: 2025-05-09

## 2025-05-09 RX ORDER — SODIUM CHLORIDE 9 MG/ML
INJECTION, SOLUTION INTRAVENOUS PRN
Status: DISCONTINUED | OUTPATIENT
Start: 2025-05-09 | End: 2025-05-09 | Stop reason: HOSPADM

## 2025-05-09 RX ORDER — DEXAMETHASONE SODIUM PHOSPHATE 10 MG/ML
INJECTION, SOLUTION INTRA-ARTICULAR; INTRALESIONAL; INTRAMUSCULAR; INTRAVENOUS; SOFT TISSUE
Status: DISCONTINUED | OUTPATIENT
Start: 2025-05-09 | End: 2025-05-09 | Stop reason: SDUPTHER

## 2025-05-09 RX ORDER — FENTANYL CITRATE 50 UG/ML
25 INJECTION, SOLUTION INTRAMUSCULAR; INTRAVENOUS EVERY 5 MIN PRN
Status: DISCONTINUED | OUTPATIENT
Start: 2025-05-09 | End: 2025-05-09 | Stop reason: HOSPADM

## 2025-05-09 RX ORDER — MEPERIDINE HYDROCHLORIDE 25 MG/ML
12.5 INJECTION INTRAMUSCULAR; INTRAVENOUS; SUBCUTANEOUS EVERY 5 MIN PRN
Status: DISCONTINUED | OUTPATIENT
Start: 2025-05-09 | End: 2025-05-09 | Stop reason: HOSPADM

## 2025-05-09 RX ORDER — ROCURONIUM BROMIDE 10 MG/ML
INJECTION, SOLUTION INTRAVENOUS
Status: DISCONTINUED | OUTPATIENT
Start: 2025-05-09 | End: 2025-05-09 | Stop reason: SDUPTHER

## 2025-05-09 RX ORDER — PROCHLORPERAZINE EDISYLATE 5 MG/ML
10 INJECTION INTRAMUSCULAR; INTRAVENOUS EVERY 6 HOURS PRN
Status: DISCONTINUED | OUTPATIENT
Start: 2025-05-09 | End: 2025-05-12 | Stop reason: HOSPADM

## 2025-05-09 RX ORDER — SODIUM CHLORIDE 9 MG/ML
INJECTION, SOLUTION INTRAVENOUS PRN
Status: DISCONTINUED | OUTPATIENT
Start: 2025-05-09 | End: 2025-05-09

## 2025-05-09 RX ORDER — ONDANSETRON 4 MG/1
4 TABLET, ORALLY DISINTEGRATING ORAL EVERY 4 HOURS PRN
Status: DISCONTINUED | OUTPATIENT
Start: 2025-05-09 | End: 2025-05-12 | Stop reason: HOSPADM

## 2025-05-09 RX ORDER — DIPHENHYDRAMINE HYDROCHLORIDE 50 MG/ML
12.5 INJECTION, SOLUTION INTRAMUSCULAR; INTRAVENOUS
Status: DISCONTINUED | OUTPATIENT
Start: 2025-05-09 | End: 2025-05-09 | Stop reason: HOSPADM

## 2025-05-09 RX ORDER — BUPIVACAINE HYDROCHLORIDE AND EPINEPHRINE 2.5; 5 MG/ML; UG/ML
INJECTION, SOLUTION EPIDURAL; INFILTRATION; INTRACAUDAL; PERINEURAL
Status: DISCONTINUED | OUTPATIENT
Start: 2025-05-09 | End: 2025-05-09 | Stop reason: SDUPTHER

## 2025-05-09 RX ORDER — HYDROMORPHONE HYDROCHLORIDE 1 MG/ML
0.5 INJECTION, SOLUTION INTRAMUSCULAR; INTRAVENOUS; SUBCUTANEOUS
Status: DISCONTINUED | OUTPATIENT
Start: 2025-05-09 | End: 2025-05-12 | Stop reason: HOSPADM

## 2025-05-09 RX ORDER — ENOXAPARIN SODIUM 100 MG/ML
40 INJECTION SUBCUTANEOUS EVERY 24 HOURS
Status: DISCONTINUED | OUTPATIENT
Start: 2025-05-09 | End: 2025-05-12 | Stop reason: HOSPADM

## 2025-05-09 RX ORDER — ACETAMINOPHEN 500 MG
1000 TABLET ORAL EVERY 8 HOURS SCHEDULED
Status: DISCONTINUED | OUTPATIENT
Start: 2025-05-09 | End: 2025-05-12 | Stop reason: HOSPADM

## 2025-05-09 RX ORDER — CEFAZOLIN SODIUM 1 G/3ML
INJECTION, POWDER, FOR SOLUTION INTRAMUSCULAR; INTRAVENOUS
Status: DISCONTINUED | OUTPATIENT
Start: 2025-05-09 | End: 2025-05-09 | Stop reason: SDUPTHER

## 2025-05-09 RX ADMIN — Medication 25 MG: at 10:37

## 2025-05-09 RX ADMIN — PROPOFOL 100 MG: 10 INJECTION, EMULSION INTRAVENOUS at 14:18

## 2025-05-09 RX ADMIN — LIDOCAINE HYDROCHLORIDE 80 MG: 20 INJECTION, SOLUTION EPIDURAL; INFILTRATION; INTRACAUDAL; PERINEURAL at 14:18

## 2025-05-09 RX ADMIN — LIDOCAINE HYDROCHLORIDE ANHYDROUS AND DEXTROSE MONOHYDRATE 1.5 MG/KG/HR: 5; 400 INJECTION, SOLUTION INTRAVENOUS at 14:30

## 2025-05-09 RX ADMIN — CEFAZOLIN 2000 MG: 10 INJECTION, POWDER, FOR SOLUTION INTRAVENOUS at 21:19

## 2025-05-09 RX ADMIN — HYDROMORPHONE HYDROCHLORIDE 0.5 MG: 1 INJECTION, SOLUTION INTRAMUSCULAR; INTRAVENOUS; SUBCUTANEOUS at 18:09

## 2025-05-09 RX ADMIN — Medication 20 MG: at 15:19

## 2025-05-09 RX ADMIN — PHENYLEPHRINE HYDROCHLORIDE 20 MCG/MIN: 10 INJECTION INTRAVENOUS at 14:30

## 2025-05-09 RX ADMIN — ACETAMINOPHEN 1000 MG: 500 TABLET, FILM COATED ORAL at 10:37

## 2025-05-09 RX ADMIN — CEFAZOLIN SODIUM 2 G: 1 INJECTION, POWDER, FOR SOLUTION INTRAMUSCULAR; INTRAVENOUS at 14:25

## 2025-05-09 RX ADMIN — FENTANYL CITRATE 50 MCG: 50 INJECTION INTRAMUSCULAR; INTRAVENOUS at 14:53

## 2025-05-09 RX ADMIN — Medication 20 MG: at 14:19

## 2025-05-09 RX ADMIN — HYDROMORPHONE HYDROCHLORIDE 1 MG: 0.5 INJECTION, SOLUTION INTRAMUSCULAR; INTRAVENOUS; SUBCUTANEOUS at 15:58

## 2025-05-09 RX ADMIN — ENOXAPARIN SODIUM 40 MG: 100 INJECTION SUBCUTANEOUS at 20:20

## 2025-05-09 RX ADMIN — SODIUM CHLORIDE: 9 INJECTION, SOLUTION INTRAVENOUS at 16:18

## 2025-05-09 RX ADMIN — SUGAMMADEX 200 MG: 100 INJECTION, SOLUTION INTRAVENOUS at 17:10

## 2025-05-09 RX ADMIN — DOCUSATE SODIUM 100 MG: 100 CAPSULE, LIQUID FILLED ORAL at 20:19

## 2025-05-09 RX ADMIN — DEXAMETHASONE SODIUM PHOSPHATE 10 MG: 10 INJECTION INTRAMUSCULAR; INTRAVENOUS at 14:25

## 2025-05-09 RX ADMIN — ACETAMINOPHEN 1000 MG: 500 TABLET, FILM COATED ORAL at 21:18

## 2025-05-09 RX ADMIN — ALBUMIN (HUMAN) 12.5 G: 12.5 INJECTION, SOLUTION INTRAVENOUS at 15:05

## 2025-05-09 RX ADMIN — SODIUM CHLORIDE: 9 INJECTION, SOLUTION INTRAVENOUS at 14:30

## 2025-05-09 RX ADMIN — ONDANSETRON 4 MG: 2 INJECTION INTRAMUSCULAR; INTRAVENOUS at 14:25

## 2025-05-09 RX ADMIN — SODIUM CHLORIDE: 9 INJECTION, SOLUTION INTRAVENOUS at 17:00

## 2025-05-09 RX ADMIN — SODIUM CHLORIDE, PRESERVATIVE FREE 10 ML: 5 INJECTION INTRAVENOUS at 21:23

## 2025-05-09 RX ADMIN — NALOXEGOL OXALATE 12.5 MG: 25 TABLET, FILM COATED ORAL at 20:18

## 2025-05-09 RX ADMIN — SODIUM CHLORIDE, SODIUM LACTATE, POTASSIUM CHLORIDE, AND CALCIUM CHLORIDE: .6; .31; .03; .02 INJECTION, SOLUTION INTRAVENOUS at 10:37

## 2025-05-09 RX ADMIN — BUPIVACAINE HYDROCHLORIDE AND EPINEPHRINE BITARTRATE 60 ML: 2.5; .005 INJECTION, SOLUTION EPIDURAL; INFILTRATION; INTRACAUDAL; PERINEURAL at 14:15

## 2025-05-09 RX ADMIN — METRONIDAZOLE 500 MG: 500 INJECTION, SOLUTION INTRAVENOUS at 20:13

## 2025-05-09 RX ADMIN — FENTANYL CITRATE 50 MCG: 50 INJECTION INTRAMUSCULAR; INTRAVENOUS at 14:18

## 2025-05-09 RX ADMIN — MIDAZOLAM 2 MG: 1 INJECTION INTRAMUSCULAR; INTRAVENOUS at 14:00

## 2025-05-09 RX ADMIN — Medication 20 MG: at 16:20

## 2025-05-09 RX ADMIN — ROCURONIUM BROMIDE 20 MG: 50 INJECTION INTRAVENOUS at 15:45

## 2025-05-09 RX ADMIN — GABAPENTIN 300 MG: 300 CAPSULE ORAL at 20:19

## 2025-05-09 RX ADMIN — ROCURONIUM BROMIDE 40 MG: 50 INJECTION INTRAVENOUS at 14:19

## 2025-05-09 RX ADMIN — SODIUM CHLORIDE, SODIUM LACTATE, POTASSIUM CHLORIDE, AND CALCIUM CHLORIDE: .6; .31; .03; .02 INJECTION, SOLUTION INTRAVENOUS at 20:11

## 2025-05-09 ASSESSMENT — PAIN DESCRIPTION - LOCATION
LOCATION: ABDOMEN
LOCATION: ABDOMEN

## 2025-05-09 ASSESSMENT — PAIN - FUNCTIONAL ASSESSMENT
PAIN_FUNCTIONAL_ASSESSMENT: NONE - DENIES PAIN
PAIN_FUNCTIONAL_ASSESSMENT: 0-10
PAIN_FUNCTIONAL_ASSESSMENT: NONE - DENIES PAIN
PAIN_FUNCTIONAL_ASSESSMENT: 0-10
PAIN_FUNCTIONAL_ASSESSMENT: NONE - DENIES PAIN
PAIN_FUNCTIONAL_ASSESSMENT: PREVENTS OR INTERFERES SOME ACTIVE ACTIVITIES AND ADLS
PAIN_FUNCTIONAL_ASSESSMENT: NONE - DENIES PAIN
PAIN_FUNCTIONAL_ASSESSMENT: NONE - DENIES PAIN

## 2025-05-09 ASSESSMENT — PAIN SCALES - GENERAL
PAINLEVEL_OUTOF10: 10
PAINLEVEL_OUTOF10: 10

## 2025-05-09 ASSESSMENT — PAIN DESCRIPTION - ORIENTATION
ORIENTATION: MID
ORIENTATION: MID

## 2025-05-09 ASSESSMENT — PAIN DESCRIPTION - DESCRIPTORS
DESCRIPTORS: BURNING
DESCRIPTORS: PRESSURE

## 2025-05-09 ASSESSMENT — PAIN DESCRIPTION - PAIN TYPE: TYPE: SURGICAL PAIN

## 2025-05-09 ASSESSMENT — LIFESTYLE VARIABLES: SMOKING_STATUS: 1

## 2025-05-09 NOTE — ANESTHESIA POSTPROCEDURE EVALUATION
Department of Anesthesiology  Postprocedure Note    Patient: Lei Garcia  MRN: [id number]  YOB: 1955  Date of evaluation: 5/9/2025    Procedure Summary       Date: 05/09/25 Room / Location: [facility] MAIN OR 02 / [facility] MAIN OR    Anesthesia Start: 1400 Anesthesia Stop: 1730    Procedure: BOWEL RESECTION LOW ANTERIOR  ERAS/APPENDECTOMY (Abdomen) Diagnosis:       Malignant neoplasm of sigmoid colon (HCC)      (Malignant neoplasm of sigmoid colon (HCC) [C18.7])    Providers: Charisse Roper MD Responsible Provider: Angelo Melvin MD    Anesthesia Type: general ASA Status: 3            Anesthesia Type: No value filed.    Franklin Phase I: Franklin Score: 9    Franklin Phase II:      Anesthesia Post Evaluation    Patient location during evaluation: PACU  Patient participation: complete - patient participated  Level of consciousness: awake and alert  Pain scale: pain adequately controlled.  Airway patency: patent  Nausea & Vomiting: no nausea and no vomiting  Cardiovascular status: hemodynamically stable  Respiratory status: acceptable  Hydration status: euvolemic  Multimodal analgesia pain management approach  Pain management: adequate    No notable events documented.

## 2025-05-09 NOTE — ANESTHESIA PRE PROCEDURE
Department of Anesthesiology  Preprocedure Note       Name:  Lei Garcia   Age:  70 y.o.  :  1955                                          MRN:  [id number]         Date:  2025      Surgeon: Surgeon(s):  Charisse Roper MD    Procedure: Procedure(s):  BOWEL RESECTION LOW ANTERIOR LAPAROSCOPIC ERAS    Medications prior to admission:   Prior to Admission medications    Medication Sig Start Date End Date Taking? Authorizing Provider   potassium chloride (KLOR-CON M) 20 MEQ extended release tablet Take 1 tablet by mouth daily  Patient taking differently: Take 1 tablet by mouth every morning 25  Yes Ravi Guevara MD   omeprazole (PRILOSEC) 20 MG delayed release capsule Take 1 capsule by mouth every morning   Yes ProviderMarlene MD   aspirin 81 MG chewable tablet Take 1 tablet by mouth every morning Indications: preventative   Yes Marlene Simpson MD   diazePAM (VALIUM) 5 MG tablet Take 1 tablet by mouth 3 times daily.   Yes Marlene Simpson MD   Nebulizers (COMPRESSOR/NEBULIZER) MISC Use 4x/d as directed  COPD J44.9 10/5/23  Yes Emory Ruffin Jr., MD   ipratropium 0.5 mg-albuterol 2.5 mg (DUONEB) 0.5-2.5 (3) MG/3ML SOLN nebulizer solution Inhale 3 mLs into the lungs every 4 hours  Patient not taking: Reported on 2025 10/5/23   Emory Ruffin Jr., MD   Respiratory Therapy Supplies (NEBULIZER/TUBING/MOUTHPIECE) KIT 1 kit by Does not apply route daily as needed (sob/wheeze) J44.9 10/5/23   Emory Ruffin Jr., MD       Current medications:    Current Facility-Administered Medications   Medication Dose Route Frequency Provider Last Rate Last Admin   • lidocaine 1 % injection 1 mL  1 mL IntraDERmal Once PRN Angelo Melvin MD       • lactated ringers infusion   IntraVENous Continuous Angelo Melvin  mL/hr at 25 1037 New Bag at 25 1037   • sodium chloride flush 0.9 % injection 5-40 mL  5-40 mL IntraVENous 2 times per day Angelo Melvin MD

## 2025-05-09 NOTE — BRIEF OP NOTE
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       Procedures:  1 ex lap with LAR and primary anastomosis  2 en bloc resection left pelvic wall  3 en bloc resection of partial bladder wall  4 partial proctectomy with mesorectal excision  5 mobilization of splenic flexure  6 appendectomy  7 omental flap      Surgeon(s):  Charisse Roper MD    Assistant:  Surgical Assistant: Surekha Mccracken    Anesthesia: General    Estimated Blood Loss (mL): 200     Complications: None    Specimens:   ID Type Source Tests Collected by Time Destination   A : rectosigmoid part of bladder wall Tissue Abdomen SURGICAL PATHOLOGY Charisse Roper MD 5/9/2025 1634    B : distal rectal margin Tissue Abdomen SURGICAL PATHOLOGY Charisse Roper MD 5/9/2025 1634    C : appendix Tissue Appendix SURGICAL PATHOLOGY Charisse Roper MD 5/9/2025 1644        Implants:  * No implants in log *      Drains:   Closed/Suction Drain Right;Distal Abdomen Bag (Active)       Urinary Catheter 05/09/25 2 Way (Active)       Findings:  Infection Present At Time Of Surgery (PATOS) (choose all levels that have infection present):  No infection present  Other Findings: as post op, large rectal sigmoid cancer with direct invasion or pushing onto pelvic wall and bladder wall, en bloc resection is obtained;   Colon Resection  Operation performed with curative intent Yes   Tumor Location (select all that apply) Rectosigmoid junction   Extent of colon and vascular resection (select all that apply) Sigmoid resection - inferior mesenteric        Electronically signed by Charisse Ropre MD on 5/9/2025 at 5:33 PM

## 2025-05-09 NOTE — ANESTHESIA PROCEDURE NOTES
Arterial Line:    An arterial line was placed using surface landmarks, in the OR for the following indication(s): continuous blood pressure monitoring and blood sampling needed.    A 20 gauge (size), 4.45 cm (length), Arrow (type) catheter was placed, Seldinger technique used, into the left radial artery, secured by Tegaderm and tape.  Anesthesia type: General    Events:  patient tolerated procedure well with no complications and EBL 0mL.5/9/2025 2:30 PM5/9/2025 2:35 PM  Resident/CRNA: Darek Ang APRN - CRNA  Performed: Resident/CRNA   Preanesthetic Checklist  Completed: patient identified, IV checked, site marked, risks and benefits discussed, surgical/procedural consents, equipment checked, pre-op evaluation, timeout performed, anesthesia consent given, oxygen available, monitors applied/VS acknowledged, fire risk safety assessment completed and verbalized and blood product R/B/A discussed and consented

## 2025-05-09 NOTE — PERIOP NOTE
TRANSFER - OUT REPORT:    Verbal report given to Jennifer on Lei ALATORRE [facility]  being transferred to [facility] for routine post-op       Report consisted of patient's Situation, Background, Assessment and   Recommendations(SBAR).     Information from the following report(s) Nurse Handoff Report was reviewed with the receiving nurse.           Lines:   Peripheral IV 05/09/25 Right Forearm (Active)   Site Assessment Clean, dry & intact 05/09/25 1800   Line Status Infusing 05/09/25 1800   Line Care Connections checked and tightened 05/09/25 1800   Phlebitis Assessment No symptoms 05/09/25 1800   Infiltration Assessment 0 05/09/25 1800   Dressing Status Clean, dry & intact 05/09/25 1800   Dressing Type Transparent 05/09/25 1800       Peripheral IV 05/09/25 Left Hand (Active)   Site Assessment Clean, dry & intact 05/09/25 1800   Line Status Infusing 05/09/25 1800   Line Care Connections checked and tightened 05/09/25 1800   Phlebitis Assessment No symptoms 05/09/25 1800   Infiltration Assessment 0 05/09/25 1800   Dressing Status Clean, dry & intact 05/09/25 1800   Dressing Type Transparent 05/09/25 1800        Opportunity for questions and clarification was provided.      Patient transported with:  [employer]

## 2025-05-09 NOTE — ANESTHESIA PROCEDURE NOTES
Peripheral Block    Patient location during procedure: OR  Reason for block: post-op pain management and at surgeon's request  Start time: 5/9/2025 2:15 PM  End time: 5/9/2025 2:20 PM  Staffing  Performed: anesthesiologist   Anesthesiologist: Angelo Melvin MD  Performed by: Angelo Melvin MD  Authorized by: Angelo Melvin MD    Preanesthetic Checklist  Completed: patient identified, IV checked, site marked, risks and benefits discussed, surgical/procedural consents, equipment checked, pre-op evaluation, timeout performed, anesthesia consent given, oxygen available, monitors applied/VS acknowledged, fire risk safety assessment completed and verbalized and blood product R/B/A discussed and consented  Peripheral Block   Patient position: supine  Prep: ChloraPrep  Provider prep: mask and sterile gloves  Patient monitoring: cardiac monitor, continuous pulse ox, continuous capnometry, frequent blood pressure checks, IV access and oxygen  Block type: TAP  Laterality: bilateral  Injection technique: single-shot  Guidance: ultrasound guided    Needle   Needle type: insulated echogenic nerve stimulator needle   Needle gauge: 20 G  Needle localization: ultrasound guidance  Needle insertion depth: 2 cm  Needle length: 10 cm  Assessment   Injection assessment: negative aspiration for heme, no paresthesia on injection, local visualized surrounding nerve on ultrasound and no intravascular symptoms  Paresthesia pain: none  Slow fractionated injection: yes  Hemodynamics: stable  Outcomes: uncomplicated    Medications Administered  BUPivacaine 0.25%-EPINEPHrine injection 1:200000 - Perineural   60 mL - 5/9/2025 2:15:00 PM

## 2025-05-10 LAB
ABO + RH BLD: NORMAL
ANION GAP SERPL CALC-SCNC: 10 MMOL/L (ref 7–16)
BASOPHILS # BLD: 0.02 K/UL (ref 0–0.2)
BASOPHILS NFR BLD: 0.1 % (ref 0–2)
BLD PROD TYP BPU: NORMAL
BLD PROD TYP BPU: NORMAL
BLOOD BANK BLOOD PRODUCT EXPIRATION DATE: NORMAL
BLOOD BANK BLOOD PRODUCT EXPIRATION DATE: NORMAL
BLOOD BANK DISPENSE STATUS: NORMAL
BLOOD BANK DISPENSE STATUS: NORMAL
BLOOD BANK ISBT PRODUCT BLOOD TYPE: 6200
BLOOD BANK ISBT PRODUCT BLOOD TYPE: 6200
BLOOD BANK PRODUCT CODE: NORMAL
BLOOD BANK PRODUCT CODE: NORMAL
BLOOD BANK UNIT TYPE AND RH: NORMAL
BLOOD BANK UNIT TYPE AND RH: NORMAL
BLOOD GROUP ANTIBODIES SERPL: NORMAL
BPU ID: NORMAL
BPU ID: NORMAL
BUN SERPL-MCNC: 15 MG/DL (ref 8–23)
CALCIUM SERPL-MCNC: 8 MG/DL (ref 8.8–10.2)
CHLORIDE SERPL-SCNC: 96 MMOL/L (ref 98–107)
CO2 SERPL-SCNC: 23 MMOL/L (ref 20–29)
CREAT SERPL-MCNC: 0.92 MG/DL (ref 0.8–1.3)
CROSSMATCH RESULT: NORMAL
CROSSMATCH RESULT: NORMAL
DIFFERENTIAL METHOD BLD: ABNORMAL
EOSINOPHIL # BLD: 0 K/UL (ref 0–0.8)
EOSINOPHIL NFR BLD: 0 % (ref 0.5–7.8)
ERYTHROCYTE [DISTWIDTH] IN BLOOD BY AUTOMATED COUNT: 25.4 % (ref 11.9–14.6)
GLUCOSE SERPL-MCNC: 148 MG/DL (ref 70–99)
HCT VFR BLD AUTO: 25.6 % (ref 41.1–50.3)
HGB BLD-MCNC: 8.6 G/DL (ref 13.6–17.2)
IMM GRANULOCYTES # BLD AUTO: 0.16 K/UL (ref 0–0.5)
IMM GRANULOCYTES NFR BLD AUTO: 0.9 % (ref 0–5)
LYMPHOCYTES # BLD: 0.49 K/UL (ref 0.5–4.6)
LYMPHOCYTES NFR BLD: 2.6 % (ref 13–44)
MAGNESIUM SERPL-MCNC: 1.2 MG/DL (ref 1.8–2.4)
MCH RBC QN AUTO: 35.7 PG (ref 26.1–32.9)
MCHC RBC AUTO-ENTMCNC: 33.6 G/DL (ref 31.4–35)
MCV RBC AUTO: 106.2 FL (ref 82–102)
MONOCYTES # BLD: 1.78 K/UL (ref 0.1–1.3)
MONOCYTES NFR BLD: 9.5 % (ref 4–12)
NEUTS SEG # BLD: 16.26 K/UL (ref 1.7–8.2)
NEUTS SEG NFR BLD: 86.9 % (ref 43–78)
NRBC # BLD: 0 K/UL (ref 0–0.2)
PHOSPHATE SERPL-MCNC: 3.7 MG/DL (ref 2.5–4.5)
PLATELET # BLD AUTO: 204 K/UL (ref 150–450)
PMV BLD AUTO: 9.6 FL (ref 9.4–12.3)
POTASSIUM SERPL-SCNC: 3.7 MMOL/L (ref 3.5–5.1)
RBC # BLD AUTO: 2.41 M/UL (ref 4.23–5.6)
SODIUM SERPL-SCNC: 129 MMOL/L (ref 136–145)
SPECIMEN EXP DATE BLD: NORMAL
UNIT DIVISION: 0
UNIT DIVISION: 0
UNIT ISSUE DATE/TIME: NORMAL
UNIT ISSUE DATE/TIME: NORMAL
WBC # BLD AUTO: 18.7 K/UL (ref 4.3–11.1)

## 2025-05-10 PROCEDURE — 97530 THERAPEUTIC ACTIVITIES: CPT

## 2025-05-10 PROCEDURE — 36415 COLL VENOUS BLD VENIPUNCTURE: CPT

## 2025-05-10 PROCEDURE — 83735 ASSAY OF MAGNESIUM: CPT

## 2025-05-10 PROCEDURE — 97165 OT EVAL LOW COMPLEX 30 MIN: CPT

## 2025-05-10 PROCEDURE — 6360000002 HC RX W HCPCS: Performed by: NURSE PRACTITIONER

## 2025-05-10 PROCEDURE — 80048 BASIC METABOLIC PNL TOTAL CA: CPT

## 2025-05-10 PROCEDURE — 97535 SELF CARE MNGMENT TRAINING: CPT

## 2025-05-10 PROCEDURE — 85025 COMPLETE CBC W/AUTO DIFF WBC: CPT

## 2025-05-10 PROCEDURE — 6360000002 HC RX W HCPCS: Performed by: PHYSICIAN ASSISTANT

## 2025-05-10 PROCEDURE — 97161 PT EVAL LOW COMPLEX 20 MIN: CPT

## 2025-05-10 PROCEDURE — 6370000000 HC RX 637 (ALT 250 FOR IP): Performed by: PHYSICIAN ASSISTANT

## 2025-05-10 PROCEDURE — 2500000003 HC RX 250 WO HCPCS: Performed by: PHYSICIAN ASSISTANT

## 2025-05-10 PROCEDURE — 84100 ASSAY OF PHOSPHORUS: CPT

## 2025-05-10 PROCEDURE — 1100000000 HC RM PRIVATE

## 2025-05-10 RX ORDER — MAGNESIUM SULFATE IN WATER 40 MG/ML
2000 INJECTION, SOLUTION INTRAVENOUS ONCE
Status: COMPLETED | OUTPATIENT
Start: 2025-05-10 | End: 2025-05-10

## 2025-05-10 RX ADMIN — HYDROMORPHONE HYDROCHLORIDE 0.5 MG: 1 INJECTION, SOLUTION INTRAMUSCULAR; INTRAVENOUS; SUBCUTANEOUS at 11:15

## 2025-05-10 RX ADMIN — GABAPENTIN 300 MG: 300 CAPSULE ORAL at 13:45

## 2025-05-10 RX ADMIN — DIAZEPAM 5 MG: 5 TABLET ORAL at 08:56

## 2025-05-10 RX ADMIN — DOCUSATE SODIUM 100 MG: 100 CAPSULE, LIQUID FILLED ORAL at 08:47

## 2025-05-10 RX ADMIN — CEFAZOLIN 2000 MG: 10 INJECTION, POWDER, FOR SOLUTION INTRAVENOUS at 05:38

## 2025-05-10 RX ADMIN — HYDROMORPHONE HYDROCHLORIDE 1 MG: 1 INJECTION, SOLUTION INTRAMUSCULAR; INTRAVENOUS; SUBCUTANEOUS at 19:19

## 2025-05-10 RX ADMIN — DOCUSATE SODIUM 100 MG: 100 CAPSULE, LIQUID FILLED ORAL at 19:20

## 2025-05-10 RX ADMIN — ACETAMINOPHEN 1000 MG: 500 TABLET, FILM COATED ORAL at 05:38

## 2025-05-10 RX ADMIN — SODIUM CHLORIDE, PRESERVATIVE FREE 10 ML: 5 INJECTION INTRAVENOUS at 19:26

## 2025-05-10 RX ADMIN — OXYCODONE HYDROCHLORIDE 10 MG: 5 TABLET ORAL at 03:37

## 2025-05-10 RX ADMIN — OXYCODONE HYDROCHLORIDE 10 MG: 5 TABLET ORAL at 08:48

## 2025-05-10 RX ADMIN — PANTOPRAZOLE SODIUM 40 MG: 40 TABLET, DELAYED RELEASE ORAL at 05:38

## 2025-05-10 RX ADMIN — ACETAMINOPHEN 1000 MG: 500 TABLET, FILM COATED ORAL at 13:45

## 2025-05-10 RX ADMIN — METRONIDAZOLE 500 MG: 500 INJECTION, SOLUTION INTRAVENOUS at 03:31

## 2025-05-10 RX ADMIN — OXYCODONE HYDROCHLORIDE 10 MG: 5 TABLET ORAL at 23:28

## 2025-05-10 RX ADMIN — MAGNESIUM SULFATE HEPTAHYDRATE 2000 MG: 40 INJECTION, SOLUTION INTRAVENOUS at 08:47

## 2025-05-10 RX ADMIN — GABAPENTIN 300 MG: 300 CAPSULE ORAL at 19:20

## 2025-05-10 RX ADMIN — GABAPENTIN 300 MG: 300 CAPSULE ORAL at 08:47

## 2025-05-10 RX ADMIN — ACETAMINOPHEN 1000 MG: 500 TABLET, FILM COATED ORAL at 22:44

## 2025-05-10 RX ADMIN — ENOXAPARIN SODIUM 40 MG: 100 INJECTION SUBCUTANEOUS at 19:20

## 2025-05-10 RX ADMIN — NALOXEGOL OXALATE 12.5 MG: 25 TABLET, FILM COATED ORAL at 08:49

## 2025-05-10 RX ADMIN — SODIUM CHLORIDE, PRESERVATIVE FREE 10 ML: 5 INJECTION INTRAVENOUS at 08:57

## 2025-05-10 ASSESSMENT — PAIN DESCRIPTION - LOCATION
LOCATION: ABDOMEN

## 2025-05-10 ASSESSMENT — PAIN SCALES - GENERAL
PAINLEVEL_OUTOF10: 8
PAINLEVEL_OUTOF10: 4
PAINLEVEL_OUTOF10: 8
PAINLEVEL_OUTOF10: 0
PAINLEVEL_OUTOF10: 0
PAINLEVEL_OUTOF10: 8
PAINLEVEL_OUTOF10: 0
PAINLEVEL_OUTOF10: 0
PAINLEVEL_OUTOF10: 8
PAINLEVEL_OUTOF10: 4

## 2025-05-10 ASSESSMENT — PAIN - FUNCTIONAL ASSESSMENT
PAIN_FUNCTIONAL_ASSESSMENT: ACTIVITIES ARE NOT PREVENTED

## 2025-05-10 ASSESSMENT — PAIN DESCRIPTION - DESCRIPTORS
DESCRIPTORS: TIGHTNESS;THROBBING
DESCRIPTORS: STABBING
DESCRIPTORS: TIGHTNESS

## 2025-05-10 ASSESSMENT — PAIN DESCRIPTION - ONSET
ONSET: ON-GOING

## 2025-05-10 ASSESSMENT — PAIN DESCRIPTION - ORIENTATION
ORIENTATION: ANTERIOR
ORIENTATION: MID;ANTERIOR
ORIENTATION: ANTERIOR
ORIENTATION: MID
ORIENTATION: MID

## 2025-05-10 ASSESSMENT — PAIN DESCRIPTION - FREQUENCY
FREQUENCY: INTERMITTENT

## 2025-05-10 ASSESSMENT — PAIN DESCRIPTION - PAIN TYPE
TYPE: SURGICAL PAIN

## 2025-05-10 NOTE — PLAN OF CARE
Problem: Pain  Goal: Verbalizes/displays adequate comfort level or baseline comfort level  Outcome: Not Progressing     Problem: Safety - Adult  Goal: Free from fall injury  Outcome: Not Progressing     Problem: Discharge Planning  Goal: Discharge to home or other facility with appropriate resources  Outcome: Not Progressing     Problem: Respiratory - Adult  Goal: Achieves optimal ventilation and oxygenation  Outcome: Not Progressing     Problem: Skin/Tissue Integrity - Adult  Goal: Incisions, wounds, or drain sites healing without S/S of infection  Outcome: Not Progressing     Problem: Musculoskeletal - Adult  Goal: Return mobility to safest level of function  Outcome: Not Progressing     Problem: Gastrointestinal - Adult  Goal: Minimal or absence of nausea and vomiting  Outcome: Not Progressing  Goal: Maintains or returns to baseline bowel function  Outcome: Not Progressing  Goal: Maintains adequate nutritional intake  Outcome: Not Progressing  Goal: Establish and maintain optimal ostomy function  Outcome: Not Progressing     Problem: Genitourinary - Adult  Goal: Absence of urinary retention  Outcome: Not Progressing  Goal: Urinary catheter remains patent  Outcome: Not Progressing     Problem: Infection - Adult  Goal: Absence of infection at discharge  Outcome: Not Progressing  Goal: Absence of infection during hospitalization  Outcome: Not Progressing  Goal: Absence of fever/infection during anticipated neutropenic period  Outcome: Not Progressing     Problem: Metabolic/Fluid and Electrolytes - Adult  Goal: Electrolytes maintained within normal limits  Outcome: Not Progressing     Problem: Hematologic - Adult  Goal: Maintains hematologic stability  Outcome: Not Progressing

## 2025-05-10 NOTE — THERAPY EVALUATION
ACUTE OCCUPATIONAL THERAPY GOALS:   (Developed with and agreed upon by patient and/or caregiver.)  1. Patient will complete lower body bathing and dressing with MODIFIED INDEPENDENCE and adaptive equipment as needed.     2. Patient will complete toileting with MODIFIED INDEPENDENCE.  3. Patient will complete grooming ADL standing edge of sink with MODIFIED INDEPENDENCE.  4. Patient will tolerate 25 minutes of OT treatment with 1-2 rest breaks to increase activity tolerance for ADLs.   5. Patient will complete functional transfers with MODIFIED INDEPENDENCE and adaptive equipment as needed.   6. Patient will perform log roll out of bed with SUPERVISION and no verbal cues from therapist.     Timeframe: 7 visits      OCCUPATIONAL THERAPY Initial Assessment, Daily Note, and AM       OT Visit Days: 1  Acknowledge Orders  Time  OT Charge Capture  Rehab Caseload Tracker    ABDOMINAL PRECAUTIONS    Lei Garcia is a 70 y.o. male   PRIMARY DIAGNOSIS: Malignant neoplasm of sigmoid colon (HCC)  Malignant neoplasm of sigmoid colon (HCC) [C18.7]  Cancer of sigmoid colon (HCC) [C18.7]  Procedure(s) (LRB):  BOWEL RESECTION LOW ANTERIOR  ERAS/APPENDECTOMY (N/A)  1 Day Post-Op  Reason for Referral: Generalized Muscle Weakness (M62.81)  Other lack of cordination (R27.8)  Difficulty in walking, Not elsewhere classified (R26.2)  Inpatient: Payor: [facility] MEDICARE / Plan: AET MEDICARE-ADVANTAGE PPO / Product Type: Medicare /     ASSESSMENT:     REHAB RECOMMENDATIONS:   Recommendation to date pending progress:  Setting:  No further skilled occupational therapy after discharge from hospital    Equipment:    To Be Determined     ASSESSMENT:  Mr. Garcia is a 71 y/o male presents POD 1 ex lap with bowel resection and appendectomy. At baseline pt is independent in ADLs and functional mobility, however reports he does not walk long distances at home. Today, pt demonstrates impairments in strength, balance, activity tolerance, ADLs

## 2025-05-10 NOTE — OP NOTE
[address]  [address]                            OPERATIVE REPORT      PATIENT NAME: MORALES GREEN           : 1955  MED REC NO: [id number]                       ROOM: [id number]  ACCOUNT NO: [id number]                       ADMIT DATE: 2025  PROVIDER: Charisse Roper MD    DATE OF SERVICE:  2025    PREOPERATIVE DIAGNOSES:  Obstructive rectosigmoid cancer.    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    PROCEDURES PERFORMED:       1. Exploratory laparotomy with low anterior resection and primary anastomosis.     2. En bloc resection of left pelvic wall.     3. En bloc resection of partial bladder wall.     4. Partial proctectomy with mesorectal excision.     5. Mobilization of splenic flexure.     6. Appendectomy.     7. Omental flap.    SURGEON:  Charisse Roper MD    ASSISTANT:  Jarod    ANESTHESIA:  general    ESTIMATED BLOOD LOSS:  About 200 mL.    SPECIMENS REMOVED:  as above    INTRAOPERATIVE FINDINGS:  He does have a large rectosigmoid cancer which directly invades or pushing on to the pelvic wall and the bladder wall.  I was able to perform en bloc resections to get a gross negative margin.     COMPLICATIONS:  none    IMPLANTS:  chante x 1    INDICATIONS:  This is a 70-year-old gentleman presenting with a very large cancer involving his rectosigmoid area.  He presented with weight loss and bleeding and partial bowel obstruction symptoms.  The patient offered urgent surgeries and he understood risks and benefits, agreed to proceed.    DESCRIPTION OF PROCEDURE:  After informed consent obtained, the patient was brought into the operating room.  General anesthesia was administered.  The patient's abdomen was prepped and draped in routine fashion.  We did put him on modified lithotomy position and we decided to perform open surgery in this

## 2025-05-10 NOTE — PROGRESS NOTES
4 Eyes Skin Assessment     NAME:  Lei Garcia  YOB: 1955  MEDICAL RECORD NUMBER:  [id number]    The patient is being assessed for  Admission    I agree that at least one RN has performed a thorough Head to Toe Skin Assessment on the patient. ALL assessment sites listed below have been assessed.      Areas assessed by both nurses:    Head, Face, Ears, Shoulders, Back, Chest, Arms, Elbows, Hands, Sacrum. Buttock, Coccyx, Ischium, Legs. Feet and Heels, and Under Medical Devices         Does the Patient have a Wound? No noted wound(s)       Abdullahi Prevention initiated by RN: Yes  Wound Care Orders initiated by RN: No    Pressure Injury (Stage 3,4, Unstageable, DTI, NWPT, and Complex wounds) if present, place Wound referral order by RN under : No    New Ostomies, if present place, Ostomy referral order under : No     Nurse 1 eSignature: Electronically signed by Shea Mead RN on 5/9/25 at 11:28 PM EDT    **SHARE this note so that the co-signing nurse can place an eSignature**    Nurse 2 eSignature: Electronically signed by Rosie Manzano RN on 5/10/25 at 3:41 AM EDT

## 2025-05-10 NOTE — THERAPY EVALUATION
ACUTE PHYSICAL THERAPY GOALS:   (Developed with and agreed upon by patient and/or caregiver.)    (1.) Lei Garcia  will move from supine to sit and sit to supine , scoot up and down, and roll side to side with MODIFIED INDEPENDENCE within 7 treatment day(s).    (2.) Lei Garcia will transfer from bed to chair and chair to bed with MODIFIED INDEPENDENCE using the least restrictive device within 7 treatment day(s).    (3.) Lei Garcia will ambulate with SUPERVISION for 300 feet with the least restrictive device within 7 treatment day(s).   (4.) Lei Garcia will perform standing static and dynamic balance activities x 8 minutes with SUPERVISION to improve safety within 7 treatment day(s).  (5.) Lei Garcia will ascend and descend full flight of stairs using 1 hand rail(s) with SUPERVISION to improve functional mobility and safety within 7 treatment day(s).  (6.) Lei Garcia will perform therapeutic exercises x 15 min for HEP with MODIFIED INDEPENDENCE to improve strength, endurance, and functional mobility within 7 treatment day(s).      PHYSICAL THERAPY Initial Assessment, Daily Note, and AM  (Link to Caseload Tracking: PT Visit Days : 1  Acknowledge Orders  Time In/Out  PT Charge Capture  Rehab Caseload Tracker    Lei Garcia is a 70 y.o. male   PRIMARY DIAGNOSIS: Malignant neoplasm of sigmoid colon (HCC)  Malignant neoplasm of sigmoid colon (HCC) [C18.7]  Cancer of sigmoid colon (HCC) [C18.7]  Procedure(s) (LRB):  BOWEL RESECTION LOW ANTERIOR  ERAS/APPENDECTOMY (N/A)  1 Day Post-Op  Reason for Referral: Generalized Muscle Weakness (M62.81)  Difficulty in walking, Not elsewhere classified (R26.2)  Inpatient: Payor: AET MEDICARE / Plan: AET MEDICARE-ADVANTAGE PPO / Product Type: Medicare /     ASSESSMENT:     REHAB RECOMMENDATIONS:   Recommendation to date pending progress:  Setting:  Home Health Therapy    Equipment:    To Be Determined (might possibly need 2WW)

## 2025-05-10 NOTE — PLAN OF CARE
Problem: Pain  Goal: Verbalizes/displays adequate comfort level or baseline comfort level  Outcome: Progressing     Problem: Safety - Adult  Goal: Free from fall injury  Outcome: Progressing     Problem: Discharge Planning  Goal: Discharge to home or other facility with appropriate resources  Outcome: Progressing     Problem: Respiratory - Adult  Goal: Achieves optimal ventilation and oxygenation  Outcome: Progressing     Problem: Skin/Tissue Integrity - Adult  Goal: Incisions, wounds, or drain sites healing without S/S of infection  Outcome: Progressing     Problem: Musculoskeletal - Adult  Goal: Return mobility to safest level of function  Outcome: Progressing     Problem: Gastrointestinal - Adult  Goal: Minimal or absence of nausea and vomiting  Outcome: Progressing  Goal: Maintains or returns to baseline bowel function  Outcome: Progressing  Goal: Maintains adequate nutritional intake  Outcome: Progressing  Goal: Establish and maintain optimal ostomy function  Outcome: Progressing     Problem: Genitourinary - Adult  Goal: Absence of urinary retention  Outcome: Progressing  Goal: Urinary catheter remains patent  Outcome: Progressing     Problem: Infection - Adult  Goal: Absence of infection at discharge  Outcome: Progressing  Goal: Absence of infection during hospitalization  Outcome: Progressing  Goal: Absence of fever/infection during anticipated neutropenic period  Outcome: Progressing     Problem: Metabolic/Fluid and Electrolytes - Adult  Goal: Electrolytes maintained within normal limits  Outcome: Progressing     Problem: Hematologic - Adult  Goal: Maintains hematologic stability  Outcome: Progressing

## 2025-05-10 NOTE — PROGRESS NOTES
Nutrition Assessment  Assessment Type: Initial  Reason for visit:  Best Practice Alert: Malnutrition Screening Tool   Malnutrition Screening Tool Score: 3    Nutrition Intervention:   Food and/or Nutrient Delivery:   Meals and Snacks:  Diet: Continue current diet and advance as medically appropriate  Medical Food Supplements:   Medical food supplement therapy:  Continue Ensure Surgery (Immunonutrition) 330 calories and 18 grams protein     Malnutrition Assessment:  Academy/A.S.P.E.N Clinical Malnutrition Criteria  Malnutrition Status: At risk for malnutrition (cancer, wt loss, s/p surgery on CLD)  Nutrition Focused Physical Exam:  Thin but no mitra wasting    Nutrition Assessment:  Food/Nutrition Related History: Patient and wife provide history.  They eat 2 meals per day. Usually a late breakfast and then dinner. He snacks occasionally. He uses a variety of nutrition supplements daily when he takes his medicine. He denies any changes to PO.      Do You Have Any Cultural, Baptist, or Ethnic Food Preferences?: No   Weight History: 10/5/24 151# (FM), 12/23/24 148# (FM), 2/3/25 148# (vascular), 3/11/25 129# (FM), 4/23/25 134# (GI), 5/1/25 133# (onc), 5/6/25 131# (surgery). This is a significant wt loss of 8.2% in 3 months.  Nutrition Background:       PMH: COPD, PAD, recent colonoscopy 4/2025 with nearly obstructing sigmoid colon mass. Admitted for surgery. Now s/p ex lap with low anterior resection and primary anastomosis, en bloc pelvic wall resection, en bloc partial bladder wall resection, partial proctectomy with mesorectal excision, mobilization of splenic flexure, appendectomy, omental flap 5/9.  Nutrition Monitoring/Evaluation:  Patient reclined in bed with wife at bedside. States he had Ensure surgery shake this am. Wife states he is tolerating about 50% of CLD. Discussed general diet advance but deferred to MD. He denied any needs.      Current Nutrition Therapies:  ADULT DIET; Clear Liquid  ADULT ORAL

## 2025-05-10 NOTE — PROGRESS NOTES
Admit Date: 2025    POD 1 Day Post-Op    Procedure:  Procedure(s):  BOWEL RESECTION LOW ANTERIOR  ERAS/APPENDECTOMY    Subjective:     Patient awake and sitting in recliner. No current complaints or acute events overnight. Tolerating CLD. No nausea or vomiting. -flatus/-BM. Wife at bedside.     Mag 1.2    Objective:       Vitals:    05/10/25 0918 05/10/25 1115 05/10/25 1145 05/10/25 1250   BP:  121/70     Pulse:       Resp: 18 18 16    Temp:       TempSrc:       SpO2:       Weight:       Height:    1.702 m (5' 7\")       Temp (24hrs), Av.9 °F (36.6 °C), Min:97.5 °F (36.4 °C), Max:98.4 °F (36.9 °C)  .  I&O reviewed as documented.    [unfilled]     Physical Exam:   Constitutional: Alert, cooperative, no acute distress   /70   Pulse 83   Temp 97.7 °F (36.5 °C) (Oral)   Resp 16   Ht 1.702 m (5' 7\")   Wt 61.6 kg (135 lb 12.8 oz)   SpO2 93%   BMI 21.27 kg/m²   Eyes: Sclera are clear. EOMs intact  ENMT: no external lesions' gross hearing normal; no obvious neck masses, no ear or lip lesions, nares normal  CV: RRR. Normal perfusion  Resp: No JVD.  Breathing is  non-labored; no audible wheezing. Room air    GI: soft and non-distended, appropriately ttp. Post op dressing c/d/I. JAYNA drain with ss output 120mL 24hr output     : Newton - clear yellow urine. 950mL out since placed yesterday  Musculoskeletal: unremarkable with normal function. No embolic signs or cyanosis.   Neuro:  Oriented; moves all 4; no focal deficits  Psychiatric: normal affect and mood, no memory impairment     Labs:   Recent Results (from the past 24 hours)   POCT Blood Gas & Electrolytes    Collection Time: 25  3:58 PM   Result Value Ref Range    POC pH 7.36 7.35 - 7.45      POC pCO2 37.6 35 - 45 MMHG    POC PO2 318 (H) 75 - 100 MMHG    POC Sodium 132 (L) 136 - 145 MMOL/L    POC Potassium 3.1 (L) 3.5 - 5.1 MMOL/L    POC Ionized Calcium 1.09 (L) 1.12 - 1.32 mmol/L    POC Glucose 217 (H) 65 - 100 MG/DL    Base Deficit

## 2025-05-10 NOTE — PROGRESS NOTES
Colorectal ERAS End of Shift Note    1 Day Post-Op    Newton: Yes    Bowel Movement: No    Bowel Sounds: hypoactive    Flatus: No    [unfilled]    Tolerating Diet?: Yes    Ensure Surgery Immunonutrion Shakes - 2 per day (POD 1-5) ?  Yes    Ambulated 1 times.    Up to chair for 2 meals.    Lidocaine: Yes    PRN Pain Medications Used?: Yes    IS Used: Yes    Ideal body weight: 66.1 kg (145 lb 11.6 oz)     Signed By: VINNIE VILLALOBOS RN     May 10, 2025

## 2025-05-11 ENCOUNTER — APPOINTMENT (OUTPATIENT)
Dept: GENERAL RADIOLOGY | Age: 70
DRG: 330 | End: 2025-05-11
Attending: SURGERY
Payer: MEDICARE

## 2025-05-11 LAB
ANION GAP SERPL CALC-SCNC: 10 MMOL/L (ref 7–16)
BASOPHILS # BLD: 0.02 K/UL (ref 0–0.2)
BASOPHILS NFR BLD: 0.1 % (ref 0–2)
BUN SERPL-MCNC: 15 MG/DL (ref 8–23)
CALCIUM SERPL-MCNC: 8.8 MG/DL (ref 8.8–10.2)
CHLORIDE SERPL-SCNC: 92 MMOL/L (ref 98–107)
CO2 SERPL-SCNC: 27 MMOL/L (ref 20–29)
CREAT SERPL-MCNC: 0.84 MG/DL (ref 0.8–1.3)
DIFFERENTIAL METHOD BLD: ABNORMAL
EKG ATRIAL RATE: 144 BPM
EKG DIAGNOSIS: NORMAL
EKG P AXIS: 87 DEGREES
EKG P-R INTERVAL: 128 MS
EKG Q-T INTERVAL: 270 MS
EKG QRS DURATION: 74 MS
EKG QTC CALCULATION (BAZETT): 400 MS
EKG R AXIS: 121 DEGREES
EKG T AXIS: 145 DEGREES
EKG VENTRICULAR RATE: 132 BPM
EOSINOPHIL # BLD: 0.01 K/UL (ref 0–0.8)
EOSINOPHIL NFR BLD: 0.1 % (ref 0.5–7.8)
ERYTHROCYTE [DISTWIDTH] IN BLOOD BY AUTOMATED COUNT: 25.2 % (ref 11.9–14.6)
GLUCOSE SERPL-MCNC: 122 MG/DL (ref 70–99)
HCT VFR BLD AUTO: 27.8 % (ref 41.1–50.3)
HGB BLD-MCNC: 9.4 G/DL (ref 13.6–17.2)
IMM GRANULOCYTES # BLD AUTO: 0.09 K/UL (ref 0–0.5)
IMM GRANULOCYTES NFR BLD AUTO: 0.6 % (ref 0–5)
LYMPHOCYTES # BLD: 0.79 K/UL (ref 0.5–4.6)
LYMPHOCYTES NFR BLD: 5.6 % (ref 13–44)
MAGNESIUM SERPL-MCNC: 1.6 MG/DL (ref 1.8–2.4)
MCH RBC QN AUTO: 36.4 PG (ref 26.1–32.9)
MCHC RBC AUTO-ENTMCNC: 33.8 G/DL (ref 31.4–35)
MCV RBC AUTO: 107.8 FL (ref 82–102)
MONOCYTES # BLD: 1.88 K/UL (ref 0.1–1.3)
MONOCYTES NFR BLD: 13.4 % (ref 4–12)
NEUTS SEG # BLD: 11.29 K/UL (ref 1.7–8.2)
NEUTS SEG NFR BLD: 80.2 % (ref 43–78)
NRBC # BLD: 0 K/UL (ref 0–0.2)
PHOSPHATE SERPL-MCNC: 2.6 MG/DL (ref 2.5–4.5)
PLATELET # BLD AUTO: 219 K/UL (ref 150–450)
PMV BLD AUTO: 9.5 FL (ref 9.4–12.3)
POTASSIUM SERPL-SCNC: 3.5 MMOL/L (ref 3.5–5.1)
RBC # BLD AUTO: 2.58 M/UL (ref 4.23–5.6)
SODIUM SERPL-SCNC: 129 MMOL/L (ref 136–145)
WBC # BLD AUTO: 14.1 K/UL (ref 4.3–11.1)

## 2025-05-11 PROCEDURE — 6360000002 HC RX W HCPCS: Performed by: NURSE PRACTITIONER

## 2025-05-11 PROCEDURE — 6370000000 HC RX 637 (ALT 250 FOR IP): Performed by: PHYSICIAN ASSISTANT

## 2025-05-11 PROCEDURE — 74018 RADEX ABDOMEN 1 VIEW: CPT

## 2025-05-11 PROCEDURE — 85025 COMPLETE CBC W/AUTO DIFF WBC: CPT

## 2025-05-11 PROCEDURE — 93010 ELECTROCARDIOGRAM REPORT: CPT | Performed by: INTERNAL MEDICINE

## 2025-05-11 PROCEDURE — 6360000002 HC RX W HCPCS: Performed by: PHYSICIAN ASSISTANT

## 2025-05-11 PROCEDURE — 2500000003 HC RX 250 WO HCPCS: Performed by: PHYSICIAN ASSISTANT

## 2025-05-11 PROCEDURE — 94760 N-INVAS EAR/PLS OXIMETRY 1: CPT

## 2025-05-11 PROCEDURE — 84100 ASSAY OF PHOSPHORUS: CPT

## 2025-05-11 PROCEDURE — 71045 X-RAY EXAM CHEST 1 VIEW: CPT

## 2025-05-11 PROCEDURE — 93005 ELECTROCARDIOGRAM TRACING: CPT | Performed by: NURSE PRACTITIONER

## 2025-05-11 PROCEDURE — 1100000000 HC RM PRIVATE

## 2025-05-11 PROCEDURE — 6370000000 HC RX 637 (ALT 250 FOR IP): Performed by: NURSE PRACTITIONER

## 2025-05-11 PROCEDURE — 36415 COLL VENOUS BLD VENIPUNCTURE: CPT

## 2025-05-11 PROCEDURE — 83735 ASSAY OF MAGNESIUM: CPT

## 2025-05-11 PROCEDURE — 80048 BASIC METABOLIC PNL TOTAL CA: CPT

## 2025-05-11 RX ORDER — MAGNESIUM SULFATE IN WATER 40 MG/ML
2000 INJECTION, SOLUTION INTRAVENOUS ONCE
Status: COMPLETED | OUTPATIENT
Start: 2025-05-11 | End: 2025-05-11

## 2025-05-11 RX ADMIN — GABAPENTIN 300 MG: 300 CAPSULE ORAL at 09:02

## 2025-05-11 RX ADMIN — ACETAMINOPHEN 1000 MG: 500 TABLET, FILM COATED ORAL at 21:17

## 2025-05-11 RX ADMIN — NALOXEGOL OXALATE 12.5 MG: 25 TABLET, FILM COATED ORAL at 09:02

## 2025-05-11 RX ADMIN — HYDROMORPHONE HYDROCHLORIDE 1 MG: 1 INJECTION, SOLUTION INTRAMUSCULAR; INTRAVENOUS; SUBCUTANEOUS at 04:55

## 2025-05-11 RX ADMIN — OXYCODONE HYDROCHLORIDE 10 MG: 5 TABLET ORAL at 21:17

## 2025-05-11 RX ADMIN — PHENOL 1 SPRAY: 1.5 LIQUID ORAL at 04:36

## 2025-05-11 RX ADMIN — OXYCODONE HYDROCHLORIDE 10 MG: 5 TABLET ORAL at 09:42

## 2025-05-11 RX ADMIN — DOCUSATE SODIUM 100 MG: 100 CAPSULE, LIQUID FILLED ORAL at 09:01

## 2025-05-11 RX ADMIN — ENOXAPARIN SODIUM 40 MG: 100 INJECTION SUBCUTANEOUS at 21:17

## 2025-05-11 RX ADMIN — GABAPENTIN 300 MG: 300 CAPSULE ORAL at 21:17

## 2025-05-11 RX ADMIN — SODIUM CHLORIDE, PRESERVATIVE FREE 10 ML: 5 INJECTION INTRAVENOUS at 21:27

## 2025-05-11 RX ADMIN — PANTOPRAZOLE SODIUM 40 MG: 40 TABLET, DELAYED RELEASE ORAL at 04:36

## 2025-05-11 RX ADMIN — SODIUM CHLORIDE, PRESERVATIVE FREE 10 ML: 5 INJECTION INTRAVENOUS at 09:18

## 2025-05-11 RX ADMIN — GABAPENTIN 300 MG: 300 CAPSULE ORAL at 13:39

## 2025-05-11 RX ADMIN — ONDANSETRON 4 MG: 4 TABLET, ORALLY DISINTEGRATING ORAL at 10:51

## 2025-05-11 RX ADMIN — ONDANSETRON 4 MG: 4 TABLET, ORALLY DISINTEGRATING ORAL at 15:57

## 2025-05-11 RX ADMIN — PROCHLORPERAZINE EDISYLATE 10 MG: 5 INJECTION INTRAMUSCULAR; INTRAVENOUS at 18:51

## 2025-05-11 RX ADMIN — ACETAMINOPHEN 1000 MG: 500 TABLET, FILM COATED ORAL at 13:39

## 2025-05-11 RX ADMIN — DOCUSATE SODIUM 100 MG: 100 CAPSULE, LIQUID FILLED ORAL at 21:17

## 2025-05-11 RX ADMIN — ACETAMINOPHEN 1000 MG: 500 TABLET, FILM COATED ORAL at 04:36

## 2025-05-11 RX ADMIN — MAGNESIUM SULFATE HEPTAHYDRATE 2000 MG: 40 INJECTION, SOLUTION INTRAVENOUS at 09:12

## 2025-05-11 ASSESSMENT — PAIN SCALES - GENERAL
PAINLEVEL_OUTOF10: 8
PAINLEVEL_OUTOF10: 0
PAINLEVEL_OUTOF10: 10
PAINLEVEL_OUTOF10: 8

## 2025-05-11 ASSESSMENT — PAIN DESCRIPTION - DESCRIPTORS
DESCRIPTORS: STABBING
DESCRIPTORS: TIGHTNESS
DESCRIPTORS: STABBING

## 2025-05-11 ASSESSMENT — PAIN DESCRIPTION - LOCATION
LOCATION: ABDOMEN

## 2025-05-11 ASSESSMENT — PAIN DESCRIPTION - ORIENTATION
ORIENTATION: ANTERIOR
ORIENTATION: MID

## 2025-05-11 NOTE — PLAN OF CARE
Problem: Respiratory - Adult  Goal: Achieves optimal ventilation and oxygenation  5/11/2025 1120 by Malu Srinivasan RN  Outcome: Not Progressing  5/10/2025 2349 by Shea Mead RN  Outcome: Not Progressing     Problem: Gastrointestinal - Adult  Goal: Minimal or absence of nausea and vomiting  5/11/2025 1120 by Malu Srinivasan RN  Outcome: Not Progressing  5/10/2025 2349 by Shea Mead RN  Outcome: Not Progressing  Goal: Maintains or returns to baseline bowel function  5/11/2025 1120 by Malu Srinivasan RN  Outcome: Not Progressing  5/10/2025 2349 by Shea Mead RN  Outcome: Not Progressing  Goal: Maintains adequate nutritional intake  5/11/2025 1120 by Malu Srinivasan RN  Outcome: Not Progressing  5/10/2025 2349 by Shea Mead RN  Outcome: Not Progressing  Goal: Establish and maintain optimal ostomy function  5/11/2025 1120 by Malu Srinivasan RN  Outcome: Not Progressing  5/10/2025 2349 by Shea Mead RN  Outcome: Not Progressing     Problem: Metabolic/Fluid and Electrolytes - Adult  Goal: Electrolytes maintained within normal limits  5/11/2025 1120 by Malu Srinivaasn RN  Outcome: Not Progressing  5/10/2025 2349 by Shea Mead RN  Outcome: Not Progressing     Problem: Pain  Goal: Verbalizes/displays adequate comfort level or baseline comfort level  5/11/2025 1120 by Malu Srinivasan RN  Outcome: Progressing  5/10/2025 2349 by Shea Mead RN  Outcome: Not Progressing     Problem: Discharge Planning  Goal: Discharge to home or other facility with appropriate resources  5/11/2025 1120 by Malu Srinivasan RN  Outcome: Progressing  5/10/2025 2349 by Shea Mead RN  Outcome: Not Progressing     Problem: Respiratory - Adult  Goal: Achieves optimal ventilation and oxygenation  5/11/2025 1120 by Malu Srinivasan RN  Outcome: Not Progressing  5/10/2025 2349 by Shea Mead RN  Outcome: Not Progressing     Problem: Skin/Tissue Integrity - Adult  Goal: Incisions,

## 2025-05-11 NOTE — PROGRESS NOTES
Colorectal ERAS End of Shift Note    2 Days Post-Op    Newton: Yes    Bowel Movement: No    Bowel Sounds: hypoactive    Flatus: No    [unfilled]    Tolerating Diet?: No; 1 episode of bilious emesis. NP aware.    Ensure Surgery Immunonutrion Shakes - 2 per day (POD 1-5) ?  No; pt drank 1/2    Ambulated 1 times.    Up to chair for 3 meals.    Lidocaine: No    PRN Pain Medications Used?: Yes    IS Used: Yes    Ideal body weight: 66.1 kg (145 lb 11.6 oz)     Signed By: VINNIE VILLALOBOS RN     May 11, 2025

## 2025-05-11 NOTE — ICUWATCH
RRT Clinical Rounding Nurse Progress Report      SUBJECTIVE: Called to assess patient secondary to RN/provider concern - elevated HR, SOB, following episode of emesis .      Vitals:    05/10/25 1900 05/10/25 2323 05/11/25 0402 05/11/25 0501   BP: (!) 149/92 (!) 145/76 (!) 128/59 115/85   Pulse: 92 71 55 (!) 38   Resp: 18 20 14    Temp: 98.1 °F (36.7 °C) 97.9 °F (36.6 °C) 98.8 °F (37.1 °C)    TempSrc: Oral Oral Oral    SpO2:  93% 91% 96%   Weight:       Height:              ASSESSMENT:  On arrival to room, I found patient to be resting in bed quietly. Patient is awake with primary RN and wife present at bedside. Patient is oriented X 4. Denies major pain or SOB. Respirations even and unlabored. Bilateral lung sounds clear on 2L NC from RA prior to emesis episode. No respiratory distress noted at this time. 12 Lead EKG completed demonstrating sinus tachycardia. X-ray at bedside for STAT CXR and KUB. No needs or concerns expressed at this time.    PLAN:  Recent labs/notes/vitals reviewed, and patient discussed with primary RN.    Palomo Cruz RN  [place]: [phone]

## 2025-05-11 NOTE — PLAN OF CARE
Problem: Pain  Goal: Verbalizes/displays adequate comfort level or baseline comfort level  5/10/2025 2349 by Shea Mead RN  Outcome: Not Progressing  5/10/2025 1451 by Malu Srinivasan RN  Outcome: Progressing     Problem: Discharge Planning  Goal: Discharge to home or other facility with appropriate resources  5/10/2025 2349 by Shea Mead RN  Outcome: Not Progressing  5/10/2025 1451 by Malu Srinivasan RN  Outcome: Progressing     Problem: Respiratory - Adult  Goal: Achieves optimal ventilation and oxygenation  5/10/2025 2349 by Shea Mead RN  Outcome: Not Progressing  5/10/2025 1451 by Malu Srinivasan RN  Outcome: Progressing     Problem: Skin/Tissue Integrity - Adult  Goal: Incisions, wounds, or drain sites healing without S/S of infection  5/10/2025 2349 by Shea Mead RN  Outcome: Not Progressing  5/10/2025 1451 by Malu Srinivasan RN  Outcome: Progressing     Problem: Musculoskeletal - Adult  Goal: Return mobility to safest level of function  5/10/2025 2349 by Shea Mead RN  Outcome: Not Progressing  5/10/2025 1451 by Malu Srinivasan RN  Outcome: Progressing     Problem: Gastrointestinal - Adult  Goal: Minimal or absence of nausea and vomiting  5/10/2025 2349 by Shea Mead RN  Outcome: Not Progressing  5/10/2025 1451 by Malu Srinivasan RN  Outcome: Progressing  Goal: Maintains or returns to baseline bowel function  5/10/2025 2349 by Shea Mead RN  Outcome: Not Progressing  5/10/2025 1451 by Malu Srinivasan RN  Outcome: Progressing  Goal: Maintains adequate nutritional intake  5/10/2025 2349 by Shea Mead RN  Outcome: Not Progressing  5/10/2025 1451 by Malu Srinivasan RN  Outcome: Progressing  Goal: Establish and maintain optimal ostomy function  5/10/2025 2349 by Shea Mead RN  Outcome: Not Progressing  5/10/2025 1451 by Malu Srinivasan RN  Outcome: Progressing     Problem: Genitourinary - Adult  Goal: Absence of urinary retention  5/10/2025

## 2025-05-11 NOTE — PROGRESS NOTES
Admit Date: 2025    POD 2 Days Post-Op    Procedure:  Procedure(s):  BOWEL RESECTION LOW ANTERIOR  ERAS/APPENDECTOMY    Subjective:     Patient awake and sitting in recliner. No current complaints. Experienced some mild shortness of breath overnight and vomited x 1 overnight. Currently tolerating CLD. No nausea or vomiting at this time. -flatus/-BM. Wife at bedside.     Mag 1.6    Objective:       Vitals:    25 0819 25 0942 25 1012 25 1234   BP: 124/70   126/70   Pulse: 99   100   Resp: 18 18 18 18   Temp: 98.1 °F (36.7 °C)   98.6 °F (37 °C)   TempSrc: Oral   Oral   SpO2: 90%   96%   Weight:       Height:           Temp (24hrs), Av.3 °F (36.8 °C), Min:97.9 °F (36.6 °C), Max:98.8 °F (37.1 °C)  .  I&O reviewed as documented.    [unfilled]     Physical Exam:   Constitutional: Alert, cooperative, no acute distress   /70   Pulse 100   Temp 98.6 °F (37 °C) (Oral)   Resp 18   Ht 1.702 m (5' 7\")   Wt 61.6 kg (135 lb 12.8 oz)   SpO2 96%   BMI 21.27 kg/m²   Eyes: Sclera are clear. EOMs intact  ENMT: no external lesions' gross hearing normal; no obvious neck masses, no ear or lip lesions, nares normal  CV: RRR. Normal perfusion  Resp: No JVD.  Breathing is  non-labored; no audible wheezing. Room air    GI: soft and mildly distended, appropriately ttp. Dressing removed. [place] c/d/I. JAYNA drain with ss output 440mL 24hr output     : Newton - clear yellow urine. 2050mL out since placed yesterday  Musculoskeletal: unremarkable with normal function. No embolic signs or cyanosis.   Neuro:  Oriented; moves all 4; no focal deficits  Psychiatric: normal affect and mood, no memory impairment     Labs:   Recent Results (from the past 24 hours)   EKG 12 Lead    Collection Time: 25  5:03 AM   Result Value Ref Range    Ventricular Rate 132 BPM    Atrial Rate 144 BPM    P-R Interval 128 ms    QRS Duration 74 ms    Q-T Interval 270 ms    QTc Calculation (Bazett) 400 ms    P Axis 87

## 2025-05-11 NOTE — PROGRESS NOTES
Colorectal ERAS End of Shift Note    1 Day Post-Op    Newton: Yes    Bowel Movement: No    Bowel Sounds: hypoactive    Flatus: No    [unfilled]    Tolerating Diet?: Yes    Ensure Surgery Immunonutrion Shakes - 2 per day (POD 1-5) ?  Yes    Ambulated 0 times.    Up to chair for 0 meals.    Lidocaine: Yes    PRN Pain Medications Used?: Yes    IS Used: Yes    Ideal body weight: 66.1 kg (145 lb 11.6 oz)     Signed By: Shea Mead RN     May 10, 2025

## 2025-05-11 NOTE — PROGRESS NOTES
Colorectal ERAS End of Shift Note    2 Days Post-Op    Newton: Yes    Bowel Movement: No    Bowel Sounds: hypoactive    Flatus: No    [unfilled]    Tolerating Diet?: No    Ensure Surgery Immunonutrion Shakes - 2 per day (POD 1-5) ?  Yes    Ambulated 0 times.    Up to chair for 0 meals.    Lidocaine: No    PRN Pain Medications Used?: Yes    IS Used: Yes    Ideal body weight: 66.1 kg (145 lb 11.6 oz)     Signed By: Shea Mead RN     May 11, 2025        Stable

## 2025-05-11 NOTE — PROGRESS NOTES
RN to bedside to administer pain control. Pt appears to be short of breath and audible wheezing heard by RN. IS used for wheezing and shortness of breath. Pt coughing up x2 mucus, pink and white/frothy. Pt began to vomit brown emesis 300ml. BP placed on pt HR irregular between  bpm.     0443- Stefania Frommel NP called regarding change of condition. Verbal orders received for chest xray, KUB, EKG.   Order read back and verified.     Pt placed on 2l NC.     0455- RRT EKG at bedside  0500-Vahid RN [place] at bedside  EKG reviewed by RN and Cayden RN. ST with occasional PVC. No CP reported. +dyspnea observed with wheezing.

## 2025-05-12 VITALS
HEIGHT: 67 IN | OXYGEN SATURATION: 91 % | BODY MASS INDEX: 21.09 KG/M2 | HEART RATE: 98 BPM | SYSTOLIC BLOOD PRESSURE: 133 MMHG | TEMPERATURE: 98.2 F | DIASTOLIC BLOOD PRESSURE: 56 MMHG | RESPIRATION RATE: 17 BRPM | WEIGHT: 134.4 LBS

## 2025-05-12 LAB
ANION GAP SERPL CALC-SCNC: 8 MMOL/L (ref 7–16)
BASOPHILS # BLD: 0.01 K/UL (ref 0–0.2)
BASOPHILS NFR BLD: 0.1 % (ref 0–2)
BUN SERPL-MCNC: 16 MG/DL (ref 8–23)
CALCIUM SERPL-MCNC: 9.1 MG/DL (ref 8.8–10.2)
CHLORIDE SERPL-SCNC: 93 MMOL/L (ref 98–107)
CO2 SERPL-SCNC: 32 MMOL/L (ref 20–29)
CREAT SERPL-MCNC: 0.95 MG/DL (ref 0.8–1.3)
DIFFERENTIAL METHOD BLD: ABNORMAL
EOSINOPHIL # BLD: 0.01 K/UL (ref 0–0.8)
EOSINOPHIL NFR BLD: 0.1 % (ref 0.5–7.8)
ERYTHROCYTE [DISTWIDTH] IN BLOOD BY AUTOMATED COUNT: 24.7 % (ref 11.9–14.6)
GLUCOSE SERPL-MCNC: 101 MG/DL (ref 70–99)
HCT VFR BLD AUTO: 28.4 % (ref 41.1–50.3)
HGB BLD-MCNC: 9.2 G/DL (ref 13.6–17.2)
IMM GRANULOCYTES # BLD AUTO: 0.08 K/UL (ref 0–0.5)
IMM GRANULOCYTES NFR BLD AUTO: 0.6 % (ref 0–5)
LYMPHOCYTES # BLD: 0.94 K/UL (ref 0.5–4.6)
LYMPHOCYTES NFR BLD: 7.6 % (ref 13–44)
MAGNESIUM SERPL-MCNC: 1.8 MG/DL (ref 1.8–2.4)
MCH RBC QN AUTO: 36.1 PG (ref 26.1–32.9)
MCHC RBC AUTO-ENTMCNC: 32.4 G/DL (ref 31.4–35)
MCV RBC AUTO: 111.4 FL (ref 82–102)
MONOCYTES # BLD: 1.33 K/UL (ref 0.1–1.3)
MONOCYTES NFR BLD: 10.8 % (ref 4–12)
NEUTS SEG # BLD: 9.97 K/UL (ref 1.7–8.2)
NEUTS SEG NFR BLD: 80.8 % (ref 43–78)
NRBC # BLD: 0 K/UL (ref 0–0.2)
PHOSPHATE SERPL-MCNC: 3.5 MG/DL (ref 2.5–4.5)
PLATELET # BLD AUTO: 236 K/UL (ref 150–450)
PMV BLD AUTO: 9.8 FL (ref 9.4–12.3)
POTASSIUM SERPL-SCNC: 3.8 MMOL/L (ref 3.5–5.1)
RBC # BLD AUTO: 2.55 M/UL (ref 4.23–5.6)
SODIUM SERPL-SCNC: 133 MMOL/L (ref 136–145)
WBC # BLD AUTO: 12.3 K/UL (ref 4.3–11.1)

## 2025-05-12 PROCEDURE — 2500000003 HC RX 250 WO HCPCS: Performed by: PHYSICIAN ASSISTANT

## 2025-05-12 PROCEDURE — 84100 ASSAY OF PHOSPHORUS: CPT

## 2025-05-12 PROCEDURE — 36415 COLL VENOUS BLD VENIPUNCTURE: CPT

## 2025-05-12 PROCEDURE — 6370000000 HC RX 637 (ALT 250 FOR IP): Performed by: PHYSICIAN ASSISTANT

## 2025-05-12 PROCEDURE — 80048 BASIC METABOLIC PNL TOTAL CA: CPT

## 2025-05-12 PROCEDURE — 85025 COMPLETE CBC W/AUTO DIFF WBC: CPT

## 2025-05-12 PROCEDURE — 83735 ASSAY OF MAGNESIUM: CPT

## 2025-05-12 RX ORDER — OXYCODONE HYDROCHLORIDE 5 MG/1
5 TABLET ORAL EVERY 6 HOURS PRN
Qty: 25 TABLET | Refills: 0 | Status: SHIPPED | OUTPATIENT
Start: 2025-05-12 | End: 2025-05-18

## 2025-05-12 RX ORDER — ENOXAPARIN SODIUM 100 MG/ML
40 INJECTION SUBCUTANEOUS EVERY 24 HOURS
Qty: 8.4 ML | Refills: 0 | Status: SHIPPED | OUTPATIENT
Start: 2025-05-12

## 2025-05-12 RX ORDER — PSEUDOEPHEDRINE HCL 30 MG
100 TABLET ORAL 2 TIMES DAILY PRN
COMMUNITY
Start: 2025-05-12

## 2025-05-12 RX ORDER — GABAPENTIN 300 MG/1
300 CAPSULE ORAL 3 TIMES DAILY
Qty: 45 CAPSULE | Refills: 0 | Status: SHIPPED | OUTPATIENT
Start: 2025-05-12 | End: 2025-05-27

## 2025-05-12 RX ADMIN — GABAPENTIN 300 MG: 300 CAPSULE ORAL at 08:05

## 2025-05-12 RX ADMIN — OXYCODONE HYDROCHLORIDE 5 MG: 5 TABLET ORAL at 04:00

## 2025-05-12 RX ADMIN — PANTOPRAZOLE SODIUM 40 MG: 40 TABLET, DELAYED RELEASE ORAL at 04:00

## 2025-05-12 RX ADMIN — NALOXEGOL OXALATE 12.5 MG: 25 TABLET, FILM COATED ORAL at 08:06

## 2025-05-12 RX ADMIN — ACETAMINOPHEN 1000 MG: 500 TABLET, FILM COATED ORAL at 04:00

## 2025-05-12 RX ADMIN — ACETAMINOPHEN 1000 MG: 500 TABLET, FILM COATED ORAL at 14:25

## 2025-05-12 RX ADMIN — SODIUM CHLORIDE, PRESERVATIVE FREE 10 ML: 5 INJECTION INTRAVENOUS at 08:09

## 2025-05-12 RX ADMIN — DOCUSATE SODIUM 100 MG: 100 CAPSULE, LIQUID FILLED ORAL at 08:06

## 2025-05-12 RX ADMIN — GABAPENTIN 300 MG: 300 CAPSULE ORAL at 14:25

## 2025-05-12 ASSESSMENT — PAIN SCALES - GENERAL
PAINLEVEL_OUTOF10: 5
PAINLEVEL_OUTOF10: 0

## 2025-05-12 ASSESSMENT — PAIN DESCRIPTION - DESCRIPTORS: DESCRIPTORS: ACHING

## 2025-05-12 ASSESSMENT — PAIN DESCRIPTION - LOCATION: LOCATION: ABDOMEN

## 2025-05-12 ASSESSMENT — PAIN DESCRIPTION - ORIENTATION: ORIENTATION: ANTERIOR

## 2025-05-12 NOTE — DISCHARGE SUMMARY
Lei Garcia  MRN: [id number]     : 1955     Age: 70 y.o.                          Admit date: 2025     Discharge date: 2025   Attending Physician: Dr. MYA Roper MD  Primary Discharge Diagnosis:   Principal Problem:    Malignant neoplasm of sigmoid colon (HCC)  Active Problems:    Cancer of sigmoid colon (HCC)  Resolved Problems:    * No resolved hospital problems. *    Primary Operations or Procedures Performed :  Procedure(s):  BOWEL RESECTION LOW ANTERIOR  ERAS/APPENDECTOMY     Brief History and Reason for Admission: Lei Garcia was admitted with the following history of present illness.    The patient states that for the last 3-4 months he had been having diarrhea with some associated bleeding. He had never had a colonoscopy in the past.  The patient underwent a colonoscopy on 2025 that revealed a large nearly obstructing mass in the sigmoid colon with, measured at 25 cm from the anal canal.  The distal end of the mass was marked with tattoo injection.  Pathology from this mass revealed superficial fragments of tubulovillous adenoma with at least high-grade dysplasia.  He then had a CT scan of the chest/abdomen/pelvis that revealed a large proximal sigmoid colon malignancy measuring 7 x 5 cm without resultant obstruction.  Regional mesenteric adenopathy in the pelvis, but no distant metastatic disease is seen.  His CEA level on May 1, 2025 was 1.5.         Hospital Course:    The patient progressed satisfactorily meeting milestones necessary for successful discharge including tolerating a diet, adequate mobility, adequate pain control, and active bowel function. Patient was deemed a good candidate for discharge at the time of morning rounding. They are to follow up as indicated in their provided discharge paperwork. The patient helped develop and voices understanding with the plan of care. They are amenable without reservations at this time to moving forward with discharge.

## 2025-05-12 NOTE — PROGRESS NOTES
Colorectal ERAS End of Shift Note    3 Days Post-Op    Newton: Yes    Bowel Movement: No    Bowel Sounds: hypoactive    Flatus: Yes    [unfilled]    Tolerating Diet?: Yes    Ensure Surgery Immunonutrion Shakes - 2 per day (POD 1-5) ?  Yes    Ambulated 0 times.    Up to chair for 0 meals.    Lidocaine: No    PRN Pain Medications Used?: Yes    IS Used: Yes    Ideal body weight: 66.1 kg (145 lb 11.6 oz)     Signed By: Shea Mead RN     May 12, 2025

## 2025-05-12 NOTE — PROGRESS NOTES
END OF SHIFT NOTE:    INTAKE/OUTPUT  05/11 0701 - 05/12 0700  In: 45.6   Out: 1435 [Urine:1125; Drains:190]  Voiding: Yes  Catheter: Yes  Drain:   Closed/Suction Drain Right;Distal Abdomen Bag (Active)   Site Description Clean, dry & intact 05/11/25 2100   Dressing Status Clean, dry & intact 05/11/25 2100   Drainage Appearance Serosanguinous 05/11/25 2100   Drain Status Open to gravity drainage 05/11/25 2100   Output (ml) 40 ml 05/11/25 2100       Urinary Catheter 05/09/25 2 Way (Active)   $ Urethral catheter insertion Inserted for procedure 05/09/25 1730   Catheter Indications Perioperative use for selected surgical procedures 05/11/25 2100   Site Assessment No urethral drainage 05/11/25 2350   Urine Color Suzette 05/12/25 0355   Urine Appearance Clear 05/12/25 0355   Collection Container Standard 05/11/25 2350   Securement Method Securing device (Describe) 05/11/25 2350   Catheter Care  Perineal wipes 05/11/25 2350   Catheter Best Practices  Drainage tube clipped to bed;Catheter secured to thigh;Tamper seal intact;Bag below bladder;Bag not on floor;Lack of dependent loop in tubing;Drainage bag less than half full 05/11/25 2350   Status Patent;Draining 05/11/25 2350   Output (mL) 100 mL 05/12/25 0355               Flatus: Patient does have flatus present.    Stool:  occurrences.    Characteristics:           Stool Assessment  Last BM (including prior to admit): 05/09/25    Emesis:  occurrences.    Characteristics:   Emesis Appearance: Bilious    VITAL SIGNS  Patient Vitals for the past 12 hrs:   Temp Pulse Resp BP SpO2   05/12/25 0355 98.2 °F (36.8 °C) (!) 101 16 129/62 93 %   05/11/25 2352 99 °F (37.2 °C) 92 15 113/63 97 %   05/11/25 2115 -- 100 -- 107/66 97 %   05/11/25 2006 98.6 °F (37 °C) 96 15 119/73 96 %       Pain Assessment  Pain Level: 5 (05/12/25 0400)  Pain Location: Abdomen  Patient's Stated Pain Goal: 0 - No pain    Ambulating  Yes    Shift report given to oncoming nurse at the bedside.    Shea

## 2025-05-12 NOTE — CARE COORDINATION
Patient has discharge orders to go home today with [facility] health care. Patient is aware and in agreement with this discharge plan. CM informed [facility] that patient will be discharged today.     No CM needs voiced or noted.     ASSESSMENT NOTE    Attending Physician: Charisse Roper MD  Admit Problem: Malignant neoplasm of sigmoid colon (HCC) [C18.7]  Cancer of sigmoid colon (HCC) [C18.7]  Date/Time of Admission: 5/9/2025  9:57 AM  Problem List:  Patient Active Problem List   Diagnosis    Chronic diarrhea    Anemia    Unintentional weight loss    Aphthous ulcer of mouth    Malignant neoplasm of sigmoid colon (HCC)    Cancer of sigmoid colon (HCC)       Service Assessment  Patient Orientation Alert and Oriented   Cognition Alert   History Provided By Patient   Primary Caregiver Self   Accompanied By/Relationship     Support Systems Spouse/Significant Other   Patient's Healthcare Decision Maker is: Legal Next of Kin   PCP Verified by CM No   Last Visit to PCP     Prior Functional Level Independent in ADLs/IADLs   Current Functional Level Independent in ADLs/IADLs   Can patient return to prior living arrangement Yes   Ability to make needs known: Good   Family able to assist with home care needs: Yes   Would you like for me to discuss the discharge plan with any other family members/significant others, and if so, who? Yes (Spouse)   Financial Resources Medicare (AETNA)   Community Resources None   CM/SW Referral       Social/Functional History  Lives With Spouse   Type of Home House   Home Layout Two level   Home Access     Entrance Stairs - Number of Steps     Entrance Stairs - Rails     Bathroom Shower/Tub Tub/Shower unit   Bathroom Toilet     Bathroom Equipment Shower chair   Bathroom Accessibility     Home Equipment Cane, Walker - Standard, Wheelchair - Manual   Receives Help From Family   ADL Assistance Independent   Bath     Dressing     Grooming     Feeding     Toileting     Homemaking Assistance     Meal

## 2025-05-12 NOTE — PLAN OF CARE
Problem: Pain  Goal: Verbalizes/displays adequate comfort level or baseline comfort level  5/11/2025 2218 by Shea Mead RN  Outcome: Not Progressing  5/11/2025 1120 by Malu Srinivasan RN  Outcome: Progressing     Problem: Safety - Adult  Goal: Free from fall injury  5/11/2025 2218 by Shea Mead RN  Outcome: Not Progressing  5/11/2025 1120 by Malu Srinivasan RN  Outcome: Progressing     Problem: Discharge Planning  Goal: Discharge to home or other facility with appropriate resources  5/11/2025 2218 by Shea Mead RN  Outcome: Not Progressing  5/11/2025 1120 by Malu Srinivasan RN  Outcome: Progressing     Problem: Respiratory - Adult  Goal: Achieves optimal ventilation and oxygenation  5/11/2025 2218 by Shea Mead RN  Outcome: Not Progressing  5/11/2025 1120 by Malu Srinivasan RN  Outcome: Not Progressing     Problem: Skin/Tissue Integrity - Adult  Goal: Incisions, wounds, or drain sites healing without S/S of infection  5/11/2025 2218 by Shea Mead RN  Outcome: Not Progressing  5/11/2025 1120 by Malu Srinivasan RN  Outcome: Progressing     Problem: Musculoskeletal - Adult  Goal: Return mobility to safest level of function  5/11/2025 2218 by Shea Mead RN  Outcome: Not Progressing  5/11/2025 1120 by Malu Srinivasan RN  Outcome: Progressing     Problem: Gastrointestinal - Adult  Goal: Minimal or absence of nausea and vomiting  5/11/2025 2218 by Shea Mead RN  Outcome: Not Progressing  5/11/2025 1120 by Malu Srinivasan RN  Outcome: Not Progressing  Goal: Maintains or returns to baseline bowel function  5/11/2025 2218 by Shea Mead RN  Outcome: Not Progressing  5/11/2025 1120 by Malu Srinivasan RN  Outcome: Not Progressing  Goal: Maintains adequate nutritional intake  5/11/2025 2218 by Shea Mead RN  Outcome: Not Progressing  5/11/2025 1120 by Malu Srinivasan RN  Outcome: Not Progressing  Goal: Establish and maintain optimal ostomy

## 2025-05-12 NOTE — DISCHARGE INSTRUCTIONS
Continue Soft diet  Gabapentin and oxycodone as needed for pain  No lifting > 20 lbs  May shower  Follow up in office in 7-10 days   Instructions: This plan will send the code FBSD to the PM system.  DO NOT or CHANGE the price. Detail Level: Simple Price (Do Not Change): 0.00

## 2025-05-12 NOTE — PROGRESS NOTES
SPIRITUAL HEALTH  Note for Med/Surg Visit                  Room # 222/01    Name: Lei Garcia           Age: 70 y.o.    Gender: male          MRN: [id number]  Episcopal: Other       Preferred Language: English    Date: 05/12/25  Visit Time: Begin Time: (P) 1405 End Time : (P) 1410 Complexity of Encounter: (P) Low      Visit Summary:  met with patient through regular rounds.  Patient's wife was with him. Patient expressed that Restorationism and has a [employer] home. Patient's social support includes his mother, wife, and sister.  provided active listening, discussion of illness, space for expression of feelings and spiritual support with prayer.  will follow up as necessary or at patient's request.       Referral/Consult From: (P) Rounding   Encounter Overview/Reason: (P) Spiritual/Emotional Needs  Encounter Code:     Crisis (if applicable):    Service Provided For: (P) Patient and family together     Patient was available.    Lori, Belief, Meaning:   Patient identifies as spiritual, is connected with a lori tradition or spiritual practice, and has beliefs or practices that help with coping during difficult times  Family/Friends identifies as spiritual, are connected with a lori tradition or spiritual practice, and have beliefs or practices that help with coping during difficult times  Rituals (if applicable)      Importance and Influence:  Patient does not have spiritual/personal beliefs that influence decisions regarding their health  Family/Friends does not have spiritual/personal beliefs that influence decisions regarding the patient's health    Community:  Patient   is connected with a spiritual community, , Support system includes , and (P) Spouse, Parent, Family members   Family/Friends   is connected with a spiritual community, , Support system includes , and (P) Spouse, Parent, Family members     Assessment and Plan of Care:   Emotions Expressed by Patient:   Assessment: (P)

## 2025-05-12 NOTE — PROGRESS NOTES
Discharge instructions reviewed with patient and spouse at bedside. Prescriptions sent to patient's pharmacy. Instructed on injection administration as patient will be self injecting lovenox shots. Patient and spouse verbalized understanding of instructions. All questions answered. Wheeled down to lobby by CNA.

## 2025-05-15 ENCOUNTER — TELEPHONE (OUTPATIENT)
Dept: FAMILY MEDICINE CLINIC | Facility: CLINIC | Age: 70
End: 2025-05-15

## 2025-05-15 DIAGNOSIS — C18.7 CANCER OF SIGMOID COLON (HCC): Primary | ICD-10-CM

## 2025-05-19 ENCOUNTER — CLINICAL DOCUMENTATION (OUTPATIENT)
Dept: CASE MANAGEMENT | Age: 70
End: 2025-05-19

## 2025-05-19 ENCOUNTER — TELEPHONE (OUTPATIENT)
Dept: FAMILY MEDICINE CLINIC | Facility: CLINIC | Age: 70
End: 2025-05-19

## 2025-05-19 NOTE — PROGRESS NOTES
Nurse Navigation outreach related to surgery coordination Patient had colectomy on 5-9-25. He is eating and drinking and moving well according to his wife. He is attempting to gain weight prior to next appt 5-27-25. He denies any complaints. Pt and his wife appreciated the call.   :     Goal of next outreach: Discuss POC for visit  Time Spent: 20 mins

## 2025-05-19 NOTE — TELEPHONE ENCOUNTER
[facility] called - Pt's heart rate is irregular and and they want to know if you are aware of it and if he needs to be seen. Call Suzette

## 2025-05-21 ENCOUNTER — RESULTS FOLLOW-UP (OUTPATIENT)
Dept: FAMILY MEDICINE CLINIC | Facility: CLINIC | Age: 70
End: 2025-05-21

## 2025-05-21 ENCOUNTER — OFFICE VISIT (OUTPATIENT)
Dept: FAMILY MEDICINE CLINIC | Facility: CLINIC | Age: 70
End: 2025-05-21
Payer: MEDICARE

## 2025-05-21 ENCOUNTER — TELEPHONE (OUTPATIENT)
Dept: FAMILY MEDICINE CLINIC | Facility: CLINIC | Age: 70
End: 2025-05-21

## 2025-05-21 VITALS
HEART RATE: 99 BPM | TEMPERATURE: 101 F | DIASTOLIC BLOOD PRESSURE: 74 MMHG | BODY MASS INDEX: 19.46 KG/M2 | HEIGHT: 67 IN | RESPIRATION RATE: 18 BRPM | WEIGHT: 124 LBS | OXYGEN SATURATION: 95 % | SYSTOLIC BLOOD PRESSURE: 128 MMHG

## 2025-05-21 DIAGNOSIS — R63.4 WEIGHT LOSS, ABNORMAL: ICD-10-CM

## 2025-05-21 DIAGNOSIS — R09.89 CHEST CONGESTION: ICD-10-CM

## 2025-05-21 DIAGNOSIS — I49.9 IRREGULAR HEART RATE: Primary | ICD-10-CM

## 2025-05-21 DIAGNOSIS — R00.0 SINUS TACHYCARDIA: ICD-10-CM

## 2025-05-21 LAB
BASOPHILS # BLD: 0.02 K/UL (ref 0–0.2)
BASOPHILS NFR BLD: 0.2 % (ref 0–2)
DIFFERENTIAL METHOD BLD: ABNORMAL
EOSINOPHIL # BLD: 0.01 K/UL (ref 0–0.8)
EOSINOPHIL NFR BLD: 0.1 % (ref 0.5–7.8)
ERYTHROCYTE [DISTWIDTH] IN BLOOD BY AUTOMATED COUNT: 23.9 % (ref 11.9–14.6)
HCT VFR BLD AUTO: 26.3 % (ref 41.1–50.3)
HGB BLD-MCNC: 8.7 G/DL (ref 13.6–17.2)
IMM GRANULOCYTES # BLD AUTO: 0.37 K/UL (ref 0–0.5)
IMM GRANULOCYTES NFR BLD AUTO: 3.5 % (ref 0–5)
LYMPHOCYTES # BLD: 0.96 K/UL (ref 0.5–4.6)
LYMPHOCYTES NFR BLD: 9 % (ref 13–44)
MCH RBC QN AUTO: 36 PG (ref 26.1–32.9)
MCHC RBC AUTO-ENTMCNC: 33.1 G/DL (ref 31.4–35)
MCV RBC AUTO: 108.7 FL (ref 82–102)
MONOCYTES # BLD: 1.13 K/UL (ref 0.1–1.3)
MONOCYTES NFR BLD: 10.6 % (ref 4–12)
NEUTS SEG # BLD: 8.18 K/UL (ref 1.7–8.2)
NEUTS SEG NFR BLD: 76.6 % (ref 43–78)
NRBC # BLD: 0 K/UL (ref 0–0.2)
PLATELET # BLD AUTO: 610 K/UL (ref 150–450)
PMV BLD AUTO: 10.8 FL (ref 9.4–12.3)
RBC # BLD AUTO: 2.42 M/UL (ref 4.23–5.6)
WBC # BLD AUTO: 10.7 K/UL (ref 4.3–11.1)

## 2025-05-21 PROCEDURE — 99214 OFFICE O/P EST MOD 30 MIN: CPT | Performed by: FAMILY MEDICINE

## 2025-05-21 PROCEDURE — 93000 ELECTROCARDIOGRAM COMPLETE: CPT | Performed by: FAMILY MEDICINE

## 2025-05-21 PROCEDURE — G2211 COMPLEX E/M VISIT ADD ON: HCPCS | Performed by: FAMILY MEDICINE

## 2025-05-21 PROCEDURE — 1159F MED LIST DOCD IN RCRD: CPT | Performed by: FAMILY MEDICINE

## 2025-05-21 PROCEDURE — 1160F RVW MEDS BY RX/DR IN RCRD: CPT | Performed by: FAMILY MEDICINE

## 2025-05-21 PROCEDURE — 1123F ACP DISCUSS/DSCN MKR DOCD: CPT | Performed by: FAMILY MEDICINE

## 2025-05-21 RX ORDER — AMOXICILLIN 500 MG/1
500 CAPSULE ORAL 3 TIMES DAILY
Qty: 30 CAPSULE | Refills: 0 | Status: SHIPPED | OUTPATIENT
Start: 2025-05-21

## 2025-05-21 ASSESSMENT — PATIENT HEALTH QUESTIONNAIRE - PHQ9
1. LITTLE INTEREST OR PLEASURE IN DOING THINGS: NOT AT ALL
SUM OF ALL RESPONSES TO PHQ QUESTIONS 1-9: 0
2. FEELING DOWN, DEPRESSED OR HOPELESS: NOT AT ALL
SUM OF ALL RESPONSES TO PHQ QUESTIONS 1-9: 0

## 2025-05-21 ASSESSMENT — ENCOUNTER SYMPTOMS
COUGH: 1
VOMITING: 0
SHORTNESS OF BREATH: 0
NAUSEA: 0
DIARRHEA: 0

## 2025-05-21 NOTE — PROGRESS NOTES
Lei Garcia (:  1955) is a 70 y.o. male,Established patient, here for evaluation of the following chief complaint(s):  Other (Irregular heart rate. Per HH nurse. EKGs in hospital 1 showed SR, 2nd showed lateral MI(Francia I) with HR way up to 130s- no MI mentioned in course summary and pt denies and CP, etc/Also chest congestion- worse at night, per wife)         Assessment & Plan  Irregular heart rate   Check EKG, labs. Is regular on exam today    Orders:  •  EKG 12 Lead= ST, 158bpm, Short KS, NO MI CHANGES as reported in EKG from hospital (Q in lead1)  •  Basic Metabolic Panel; Future  •  CBC with Auto Differential; Future  refer to Cardiology for evaluation  Sinus tachycardia   New, uncertain prognosis, no ST changes today, no Qs today    Orders:  •  Basic Metabolic Panel; Future  •  CBC with Auto Differential; Future  •  [facility] - Liam Allan MD, Cardiology, [place]    Weight loss, abnormal    Given samples of Ensure supplements  use bid-tid with meals         Chest congestion   Acute condition, new, mild temp today, will add Amoxicillin    Orders:  •  amoxicillin (AMOXIL) 500 MG capsule; Take 1 capsule by mouth 3 times daily    FU as scheduled and PRN       Subjective   Other  This is a new problem. The current episode started 1 to 4 weeks ago. The problem occurs intermittently. The problem has been unchanged. Associated symptoms include congestion, coughing and a fever. Pertinent negatives include no chest pain, chills, fatigue, nausea or vomiting.   Chest Congestion  This is a new problem. The current episode started in the past 7 days. The problem occurs constantly. The problem has been unchanged. Associated symptoms include congestion, coughing and a fever. Pertinent negatives include no chest pain, chills, fatigue, nausea or vomiting.     Review of Systems   Constitutional:  Positive for fever. Negative for chills and fatigue.   HENT:  Positive for congestion.    Respiratory:

## 2025-05-22 LAB
ANION GAP SERPL CALC-SCNC: 13 MMOL/L (ref 7–16)
BUN SERPL-MCNC: 19 MG/DL (ref 8–23)
CALCIUM SERPL-MCNC: 9.5 MG/DL (ref 8.8–10.2)
CHLORIDE SERPL-SCNC: 93 MMOL/L (ref 98–107)
CO2 SERPL-SCNC: 28 MMOL/L (ref 20–29)
CREAT SERPL-MCNC: 1.24 MG/DL (ref 0.8–1.3)
GLUCOSE SERPL-MCNC: 107 MG/DL (ref 70–99)
POTASSIUM SERPL-SCNC: 5.2 MMOL/L (ref 3.5–5.1)
SODIUM SERPL-SCNC: 134 MMOL/L (ref 136–145)

## 2025-05-23 ENCOUNTER — TELEPHONE (OUTPATIENT)
Dept: FAMILY MEDICINE CLINIC | Facility: CLINIC | Age: 70
End: 2025-05-23

## 2025-05-23 NOTE — TELEPHONE ENCOUNTER
[facility] called - Carolin is letting you know that patient has not answered his phone all week.

## 2025-05-27 ENCOUNTER — OFFICE VISIT (OUTPATIENT)
Dept: SURGERY | Age: 70
End: 2025-05-27

## 2025-05-27 ENCOUNTER — TELEPHONE (OUTPATIENT)
Dept: ONCOLOGY | Age: 70
End: 2025-05-27

## 2025-05-27 VITALS — WEIGHT: 124 LBS | BODY MASS INDEX: 19.46 KG/M2 | HEIGHT: 67 IN

## 2025-05-27 DIAGNOSIS — C18.7 CANCER OF SIGMOID COLON (HCC): Primary | ICD-10-CM

## 2025-05-27 PROCEDURE — 99024 POSTOP FOLLOW-UP VISIT: CPT | Performed by: PHYSICIAN ASSISTANT

## 2025-05-27 NOTE — PROGRESS NOTES
[facility]  [address], [address]  [place] [address]  [phone]      SUBJECTIVE: Lei Garcia is a 70 y.o. male is seen for a routine postop check. The patient states he is doing well overall. He is tolerating a regular diet, he is drinking ensure daily. He is having regular bowel movements. He is tolerating his day to day activity.     OBJECTIVE: Appears well. Wound is well healed without complications or infection.    Path:  A: Rectosigmoid colon and portion of bladder wall, resection:   -Invasive adenocarcinoma, well-differentiated, 6.9 cm.   -Tumor focally involves pericolorectal soft tissue.  See note.   -Margins are negative.   -Forty lymph nodes, negative for metastatic carcinoma (0/40).   -See synoptic report.     Note: The tumor is largely superficial, but there is marked underlying   abscess formation as well as associated diverticulosis and dense   underlying fibrous adhesions. Tumor focally involves pericolorectal soft   tissue and is classified as pT3. A clear perforation is not identified and   no involvement of peritoneal wall is identified. Immunohistochemistry for  mismatch repair proteins has been ordered and will be reported in an  addendum.     B: Distal rectal margin, resection:   -Negative for carcinoma.     C: Appendix, appendectomy:   -Appendix with no specific pathologic diagnosis.     ASSESSMENT: normal postoperative course, doing well. 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    PLAN: Staples removed. Follow up with oncology. Return in 4 weeks for recheck.    Siva Hensley PA-C

## 2025-05-27 NOTE — TELEPHONE ENCOUNTER
Physician provider: Dr. Guevara  Reason for today's call (Please detail here patients chief complaint): Pt calling to be seen by Dr. Guevara sooner, per his surgeon. Pt called to cancel 5/27 appt because they were unaware of the appt today. When they called back to r/s, the pt was told July is the next available appt.  Last office visit:n/a  Patient Callback Number: [phone]   Was callback number verified?: Yes  Preferred pharmacy (If refill request): n/a  Veriified that patient confirmed no refills left at pharmacy? :No  Calls to office within the last 48 hours?:No    Warm Transfer to (For Red Words): n/a    Patient notified that their information will be routed to the [facility] clinical triage team for review. Patient is advised that they will receive a phone call from the triage department. If symptom related and symptoms worsen before receiving a call back, the patient has been advised to proceed to the nearest ED.

## 2025-06-04 ENCOUNTER — HOSPITAL ENCOUNTER (OUTPATIENT)
Dept: LAB | Age: 70
Discharge: HOME | End: 2025-06-04
Payer: MEDICARE

## 2025-06-04 ENCOUNTER — OFFICE VISIT (OUTPATIENT)
Dept: ONCOLOGY | Age: 70
End: 2025-06-04
Payer: MEDICARE

## 2025-06-04 VITALS
DIASTOLIC BLOOD PRESSURE: 74 MMHG | SYSTOLIC BLOOD PRESSURE: 112 MMHG | WEIGHT: 130.4 LBS | HEIGHT: 67 IN | BODY MASS INDEX: 20.47 KG/M2 | RESPIRATION RATE: 16 BRPM | OXYGEN SATURATION: 100 % | HEART RATE: 66 BPM | TEMPERATURE: 98.1 F

## 2025-06-04 DIAGNOSIS — C18.7 CANCER OF SIGMOID COLON (HCC): ICD-10-CM

## 2025-06-04 DIAGNOSIS — C18.9 MALIGNANT NEOPLASM OF COLON, UNSPECIFIED PART OF COLON (HCC): Primary | ICD-10-CM

## 2025-06-04 LAB
ALBUMIN SERPL-MCNC: 3.3 G/DL (ref 3.2–4.6)
ALBUMIN/GLOB SERPL: 0.8 (ref 1–1.9)
ALP SERPL-CCNC: 70 U/L (ref 40–129)
ALT SERPL-CCNC: 5 U/L (ref 8–55)
ANION GAP SERPL CALC-SCNC: 13 MMOL/L (ref 7–16)
AST SERPL-CCNC: 9 U/L (ref 15–37)
BASOPHILS # BLD: 0.01 K/UL (ref 0–0.2)
BASOPHILS NFR BLD: 0.2 % (ref 0–2)
BILIRUB SERPL-MCNC: 0.3 MG/DL (ref 0–1.2)
BUN SERPL-MCNC: 12 MG/DL (ref 8–23)
CALCIUM SERPL-MCNC: 9.5 MG/DL (ref 8.8–10.2)
CEA SERPL-MCNC: 0.8 NG/ML (ref 0–3.8)
CHLORIDE SERPL-SCNC: 99 MMOL/L (ref 98–107)
CO2 SERPL-SCNC: 26 MMOL/L (ref 20–29)
CREAT SERPL-MCNC: 0.88 MG/DL (ref 0.8–1.3)
DIFFERENTIAL METHOD BLD: ABNORMAL
EOSINOPHIL # BLD: 0.01 K/UL (ref 0–0.8)
EOSINOPHIL NFR BLD: 0.2 % (ref 0.5–7.8)
ERYTHROCYTE [DISTWIDTH] IN BLOOD BY AUTOMATED COUNT: 24.3 % (ref 11.9–14.6)
FERRITIN SERPL-MCNC: 606 NG/ML (ref 8–388)
GLOBULIN SER CALC-MCNC: 4.3 G/DL (ref 2.3–3.5)
GLUCOSE SERPL-MCNC: 99 MG/DL (ref 70–99)
HCT VFR BLD AUTO: 23 % (ref 41.1–50.3)
HGB BLD-MCNC: 7.2 G/DL (ref 13.6–17.2)
IMM GRANULOCYTES # BLD AUTO: 0.03 K/UL (ref 0–0.5)
IMM GRANULOCYTES NFR BLD AUTO: 0.5 % (ref 0–5)
IRON SATN MFR SERPL: 17 % (ref 20–50)
IRON SERPL-MCNC: 41 UG/DL (ref 35–100)
LYMPHOCYTES # BLD: 1.18 K/UL (ref 0.5–4.6)
LYMPHOCYTES NFR BLD: 17.7 % (ref 13–44)
MCH RBC QN AUTO: 36.9 PG (ref 26.1–32.9)
MCHC RBC AUTO-ENTMCNC: 31.3 G/DL (ref 31.4–35)
MCV RBC AUTO: 117.9 FL (ref 82–102)
MONOCYTES # BLD: 0.84 K/UL (ref 0.1–1.3)
MONOCYTES NFR BLD: 12.6 % (ref 4–12)
NEUTS SEG # BLD: 4.58 K/UL (ref 1.7–8.2)
NEUTS SEG NFR BLD: 68.8 % (ref 43–78)
NRBC # BLD: 0 K/UL (ref 0–0.2)
PLATELET # BLD AUTO: 183 K/UL (ref 150–450)
PMV BLD AUTO: 9.9 FL (ref 9.4–12.3)
POTASSIUM SERPL-SCNC: 3.9 MMOL/L (ref 3.5–5.1)
PROT SERPL-MCNC: 7.5 G/DL (ref 6.3–8.2)
RBC # BLD AUTO: 1.95 M/UL (ref 4.23–5.6)
SODIUM SERPL-SCNC: 138 MMOL/L (ref 136–145)
TIBC SERPL-MCNC: 240 UG/DL (ref 240–450)
UIBC SERPL-MCNC: 199 UG/DL (ref 112–347)
WBC # BLD AUTO: 6.7 K/UL (ref 4.3–11.1)

## 2025-06-04 PROCEDURE — 83550 IRON BINDING TEST: CPT

## 2025-06-04 PROCEDURE — 1123F ACP DISCUSS/DSCN MKR DOCD: CPT | Performed by: INTERNAL MEDICINE

## 2025-06-04 PROCEDURE — 99215 OFFICE O/P EST HI 40 MIN: CPT | Performed by: INTERNAL MEDICINE

## 2025-06-04 PROCEDURE — 83540 ASSAY OF IRON: CPT

## 2025-06-04 PROCEDURE — 82728 ASSAY OF FERRITIN: CPT

## 2025-06-04 PROCEDURE — 85025 COMPLETE CBC W/AUTO DIFF WBC: CPT

## 2025-06-04 PROCEDURE — 36415 COLL VENOUS BLD VENIPUNCTURE: CPT

## 2025-06-04 PROCEDURE — 80053 COMPREHEN METABOLIC PANEL: CPT

## 2025-06-04 PROCEDURE — 1126F AMNT PAIN NOTED NONE PRSNT: CPT | Performed by: INTERNAL MEDICINE

## 2025-06-04 PROCEDURE — 82378 CARCINOEMBRYONIC ANTIGEN: CPT

## 2025-06-04 ASSESSMENT — PATIENT HEALTH QUESTIONNAIRE - PHQ9
SUM OF ALL RESPONSES TO PHQ QUESTIONS 1-9: 0
SUM OF ALL RESPONSES TO PHQ QUESTIONS 1-9: 0
2. FEELING DOWN, DEPRESSED OR HOPELESS: NOT AT ALL
1. LITTLE INTEREST OR PLEASURE IN DOING THINGS: NOT AT ALL
SUM OF ALL RESPONSES TO PHQ QUESTIONS 1-9: 0
SUM OF ALL RESPONSES TO PHQ QUESTIONS 1-9: 0

## 2025-06-04 NOTE — PROGRESS NOTES
[facility]    6/4/2025    2:18 PM    Lei Garcia [id number]    70 y.o.      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  Referred to GI Dr. Schwab and underwent EGD/colonoscopy 4/25/2025 showing gastritis, and a large nearly obstructing mass in sigmoid colon, unable to traverse mass with scope.  Multiple biopsies taken and tattoo placed.  Patient now referred to us.  Final pathology from sigmoid mass read as tubulovillous adenoma with high-grade dysplasia at least.  A deeper more advanced malignancy could not be excluded.  Patient underwent CT AP with contrast 4/28/2025 showing large proximal sigmoid colon malignancy measuring 7 x 5 cm with regional mesenteric adenopathy in the pelvis but no distant metastasis or liver lesions.  Possible focal pyelonephritis seen also.  Recent CEA was normal, ferritin high. Reports loose stools/diarrhea x 3 months. Occasional blood per rectum. Does report passing gas. Reports 20 lb weight loss over last 3 months.  Interval History:  6/4/2025: Labs from previous visit reviewed with adequate iron stores.  CEA normal at 1.4.  PET scan were done.  He was seen by Dr. Roper and taken to the OR 5/9/2025 with en bloc resection, with primary anastomosis, partial bladder wall resection, partial proctectomy with mesorectal excision and appendectomy.  Per procedure note he was found to have a large rectosigmoid cancer which directly invades or is pushing onto the pelvic wall and bladder wall.  En bloc resection was performed and able to get gross negative margins.  Final pathology with well-differentiated invasive adenocarcinoma 6.9 cm in size, focally

## 2025-06-04 NOTE — PATIENT INSTRUCTIONS
Patient Information from Today's Visit    The members of your [facility] are listed below:    Physician Provider: Dr. Ravi Guevara   Advanced Practice Clinician: June Leyva   Registered Nurse: Tasia ELIAS   Nurse Navigator: RANDY  Medical Assistant: Teri ELIAS   : June \"Micaela\" I.   Supportive Care Services: [facility]    Diagnosis (Information Sheet Provided on Day of Diagnosis): Colon    Follow Up Instructions:   - Labs reviewed  - Pathology reviewed  - Will arrange PET scan to finalize staging  - Briefly reviewed capecitabine; printed information about capecitabine provided to you today. Finalized treatment plan to be determined based on PET scan results.    Follow up in 4-5 weeks with labs prior    Has Treatment Plan Been Finalized? No    Current Labs:   Hospital Outpatient Visit on 06/04/2025   Component Date Value Ref Range Status    CEA 06/04/2025 0.8  0.0 - 3.8 ng/mL Final    Comment: RESULTS CHECKED X 2  Nonsmoker: <3.9 ng/mL  Smoker: <5.6 ng/mL  Roche ECLIA methodology  Patient's results of tumor marker testing may not be comparable to labs using different manufacturers/methods.      Ferritin 06/04/2025 606 (H)  8 - 388 NG/ML Final    Iron 06/04/2025 41  35 - 100 ug/dL Final    TIBC 06/04/2025 240  240 - 450 ug/dL Final    Iron % Saturation 06/04/2025 17 (L)  20 - 50 % Final    UIBC 06/04/2025 199.0  112.0 - 347.0 ug/dL Final    Sodium 06/04/2025 138  136 - 145 mmol/L Final    Potassium 06/04/2025 3.9  3.5 - 5.1 mmol/L Final    Chloride 06/04/2025 99  98 - 107 mmol/L Final    CO2 06/04/2025 26  20 - 29 mmol/L Final    Anion Gap 06/04/2025 13  7 - 16 mmol/L Final    Glucose 06/04/2025 99  70 - 99 mg/dL Final    Comment: <70 mg/dL Consistent with, but not fully diagnostic of hypoglycemia.  100 - 125 mg/dL Impaired fasting glucose/consistent with pre-diabetes mellitus.  > 126 mg/dl Fasting glucose consistent with overt diabetes mellitus      BUN 06/04/2025 12  8 - 23 MG/DL Final

## 2025-06-06 ENCOUNTER — CLINICAL DOCUMENTATION (OUTPATIENT)
Dept: ONCOLOGY | Age: 70
End: 2025-06-06

## 2025-06-19 ENCOUNTER — HOSPITAL ENCOUNTER (OUTPATIENT)
Dept: PET IMAGING | Age: 70
Discharge: HOME | End: 2025-06-22
Payer: MEDICARE

## 2025-06-19 ENCOUNTER — CLINICAL DOCUMENTATION (OUTPATIENT)
Dept: CASE MANAGEMENT | Age: 70
End: 2025-06-19

## 2025-06-19 ENCOUNTER — HOSPITAL ENCOUNTER (OUTPATIENT)
Dept: LAB | Age: 70
Discharge: HOME | End: 2025-06-19
Payer: MEDICARE

## 2025-06-19 DIAGNOSIS — D64.9 ANEMIA, UNSPECIFIED TYPE: ICD-10-CM

## 2025-06-19 DIAGNOSIS — C18.7 CANCER OF SIGMOID COLON (HCC): ICD-10-CM

## 2025-06-19 DIAGNOSIS — C18.7 CANCER OF SIGMOID COLON (HCC): Primary | ICD-10-CM

## 2025-06-19 LAB
ABO + RH BLD: NORMAL
BASOPHILS # BLD: 0.01 K/UL (ref 0–0.2)
BASOPHILS NFR BLD: 0.3 % (ref 0–2)
BLOOD GROUP ANTIBODIES SERPL: NORMAL
DIFFERENTIAL METHOD BLD: ABNORMAL
EOSINOPHIL # BLD: 0.01 K/UL (ref 0–0.8)
EOSINOPHIL NFR BLD: 0.3 % (ref 0.5–7.8)
ERYTHROCYTE [DISTWIDTH] IN BLOOD BY AUTOMATED COUNT: 20.6 % (ref 11.9–14.6)
GLUCOSE BLD STRIP.AUTO-MCNC: 86 MG/DL (ref 65–100)
HCT VFR BLD AUTO: 25.8 % (ref 41.1–50.3)
HGB BLD-MCNC: 8.3 G/DL (ref 13.6–17.2)
IMM GRANULOCYTES # BLD AUTO: 0.02 K/UL (ref 0–0.5)
IMM GRANULOCYTES NFR BLD AUTO: 0.6 % (ref 0–5)
LYMPHOCYTES # BLD: 1.08 K/UL (ref 0.5–4.6)
LYMPHOCYTES NFR BLD: 34.8 % (ref 13–44)
MCH RBC QN AUTO: 38.1 PG (ref 26.1–32.9)
MCHC RBC AUTO-ENTMCNC: 32.2 G/DL (ref 31.4–35)
MCV RBC AUTO: 118.3 FL (ref 82–102)
MONOCYTES # BLD: 0.49 K/UL (ref 0.1–1.3)
MONOCYTES NFR BLD: 15.8 % (ref 4–12)
NEUTS SEG # BLD: 1.49 K/UL (ref 1.7–8.2)
NEUTS SEG NFR BLD: 48.2 % (ref 43–78)
NRBC # BLD: 0 K/UL (ref 0–0.2)
PLATELET # BLD AUTO: 243 K/UL (ref 150–450)
PLATELET COMMENT: ADEQUATE
PMV BLD AUTO: 9.8 FL (ref 9.4–12.3)
RBC # BLD AUTO: 2.18 M/UL (ref 4.23–5.6)
RBC MORPH BLD: ABNORMAL
SERVICE CMNT-IMP: NORMAL
SPECIMEN EXP DATE BLD: NORMAL
WBC # BLD AUTO: 3.1 K/UL (ref 4.3–11.1)
WBC MORPH BLD: ABNORMAL

## 2025-06-19 PROCEDURE — 85025 COMPLETE CBC W/AUTO DIFF WBC: CPT

## 2025-06-19 PROCEDURE — 6360000004 HC RX CONTRAST MEDICATION: Performed by: INTERNAL MEDICINE

## 2025-06-19 PROCEDURE — 78815 PET IMAGE W/CT SKULL-THIGH: CPT

## 2025-06-19 PROCEDURE — 86900 BLOOD TYPING SEROLOGIC ABO: CPT

## 2025-06-19 PROCEDURE — 82962 GLUCOSE BLOOD TEST: CPT

## 2025-06-19 PROCEDURE — 2500000003 HC RX 250 WO HCPCS: Performed by: INTERNAL MEDICINE

## 2025-06-19 PROCEDURE — 36415 COLL VENOUS BLD VENIPUNCTURE: CPT

## 2025-06-19 PROCEDURE — 3430000000 HC RX DIAGNOSTIC RADIOPHARMACEUTICAL: Performed by: INTERNAL MEDICINE

## 2025-06-19 PROCEDURE — 86901 BLOOD TYPING SEROLOGIC RH(D): CPT

## 2025-06-19 PROCEDURE — 86850 RBC ANTIBODY SCREEN: CPT

## 2025-06-19 PROCEDURE — A9609 HC RX DIAGNOSTIC RADIOPHARMACEUTICAL: HCPCS | Performed by: INTERNAL MEDICINE

## 2025-06-19 RX ORDER — SODIUM CHLORIDE 0.9 % (FLUSH) 0.9 %
20 SYRINGE (ML) INJECTION AS NEEDED
Status: DISCONTINUED | OUTPATIENT
Start: 2025-06-19 | End: 2025-06-23 | Stop reason: HOSPADM

## 2025-06-19 RX ORDER — DIATRIZOATE MEGLUMINE AND DIATRIZOATE SODIUM 660; 100 MG/ML; MG/ML
10 SOLUTION ORAL; RECTAL
Status: DISCONTINUED | OUTPATIENT
Start: 2025-06-19 | End: 2025-06-23 | Stop reason: HOSPADM

## 2025-06-19 RX ORDER — FLUDEOXYGLUCOSE F 18 200 MCI/ML
12.32 INJECTION, SOLUTION INTRAVENOUS
Status: COMPLETED | OUTPATIENT
Start: 2025-06-19 | End: 2025-06-19

## 2025-06-19 RX ADMIN — SODIUM CHLORIDE, PRESERVATIVE FREE 20 ML: 5 INJECTION INTRAVENOUS at 10:23

## 2025-06-19 RX ADMIN — DIATRIZOATE MEGLUMINE AND DIATRIZOATE SODIUM 10 ML: 660; 100 LIQUID ORAL; RECTAL at 10:23

## 2025-06-19 RX ADMIN — FLUDEOXYGLUCOSE F 18 12.32 MILLICURIE: 200 INJECTION, SOLUTION INTRAVENOUS at 10:23

## 2025-06-19 NOTE — PROGRESS NOTES
Verbal order for cbc, type and screen received from Dr. Guevara with verbal read back to confirm. Order signed and routed to provider for co signature.

## 2025-06-19 NOTE — PROGRESS NOTES
Chart review covering for GI navigation. Patient presented for PET today. Planned follow up with Dr Roper 6/24 and Dr Guevara 7/7. GI Navigation will follow for plan of care.

## 2025-06-23 ENCOUNTER — OFFICE VISIT (OUTPATIENT)
Dept: FAMILY MEDICINE CLINIC | Facility: CLINIC | Age: 70
End: 2025-06-23
Payer: MEDICARE

## 2025-06-23 VITALS
DIASTOLIC BLOOD PRESSURE: 74 MMHG | HEIGHT: 67 IN | HEART RATE: 50 BPM | WEIGHT: 125 LBS | SYSTOLIC BLOOD PRESSURE: 118 MMHG | RESPIRATION RATE: 18 BRPM | BODY MASS INDEX: 19.62 KG/M2 | TEMPERATURE: 98 F | OXYGEN SATURATION: 99 %

## 2025-06-23 DIAGNOSIS — C18.7 MALIGNANT NEOPLASM OF SIGMOID COLON (HCC): ICD-10-CM

## 2025-06-23 DIAGNOSIS — J44.9 CHRONIC OBSTRUCTIVE PULMONARY DISEASE, UNSPECIFIED COPD TYPE (HCC): ICD-10-CM

## 2025-06-23 DIAGNOSIS — E78.00 HYPERCHOLESTEROLEMIA: Primary | ICD-10-CM

## 2025-06-23 DIAGNOSIS — D53.9 MACROCYTIC ANEMIA: ICD-10-CM

## 2025-06-23 PROCEDURE — 1160F RVW MEDS BY RX/DR IN RCRD: CPT | Performed by: FAMILY MEDICINE

## 2025-06-23 PROCEDURE — G2211 COMPLEX E/M VISIT ADD ON: HCPCS | Performed by: FAMILY MEDICINE

## 2025-06-23 PROCEDURE — 1123F ACP DISCUSS/DSCN MKR DOCD: CPT | Performed by: FAMILY MEDICINE

## 2025-06-23 PROCEDURE — 99214 OFFICE O/P EST MOD 30 MIN: CPT | Performed by: FAMILY MEDICINE

## 2025-06-23 PROCEDURE — 1159F MED LIST DOCD IN RCRD: CPT | Performed by: FAMILY MEDICINE

## 2025-06-23 ASSESSMENT — ENCOUNTER SYMPTOMS
HEMATOCHEZIA: 0
NAUSEA: 0
COUGH: 0
CHEST TIGHTNESS: 0
SHORTNESS OF BREATH: 0
DIARRHEA: 0
HEMATEMESIS: 0
VOMITING: 0

## 2025-06-23 ASSESSMENT — PATIENT HEALTH QUESTIONNAIRE - PHQ9
1. LITTLE INTEREST OR PLEASURE IN DOING THINGS: NOT AT ALL
SUM OF ALL RESPONSES TO PHQ QUESTIONS 1-9: 0
2. FEELING DOWN, DEPRESSED OR HOPELESS: NOT AT ALL

## 2025-06-23 ASSESSMENT — COPD QUESTIONNAIRES: COPD: 1

## 2025-06-23 NOTE — PROGRESS NOTES
Lei Garcia (:  1955) is a 70 y.o. male,Established patient, here for evaluation of the following chief complaint(s):  Follow-up (Chronic care follow up. Not fasting. ), Cholesterol Problem (Repeat labs), Anemia (Check CBC), and COPD (stable)         Assessment & Plan  Hypercholesterolemia   Chronic, at goal (stable), continue current treatment plan  Well-controlled/stable, check labs  Orders:  •  Comprehensive Metabolic Panel; Future  •  Lipid Panel; Future    Macrocytic anemia   Chronic, at goal (stable), continue current treatment plan  Stable, check ferritin level  Orders:  •  Ferritin; Future    Malignant neoplasm of sigmoid colon (HCC)   Monitored by specialist- no acute findings meriting change in the plan  Follow up with oncology/GI       Chronic obstructive pulmonary disease, unspecified COPD type (HCC)   Chronic, at goal (stable), continue current treatment plan  Stable, continue meds         Return in about 3 months (around 2025) for fasting chronic care.       Subjective   Anemia  Presents for follow-up visit. There has been no leg swelling or light-headedness. Signs of blood loss that are not present include hematemesis, hematochezia and melena.   COPD  There is no chest tightness, cough or shortness of breath. This is a chronic problem. The current episode started more than 1 year ago. The problem occurs every several days. The problem has been unchanged. Pertinent negatives include no chest pain.   Hyperlipidemia  This is a chronic problem. The current episode started more than 1 year ago. The problem is controlled. Recent lipid tests were reviewed and are variable. Pertinent negatives include no chest pain or shortness of breath.     Review of Systems   Constitutional:  Negative for chills and fatigue.   Respiratory:  Negative for cough and shortness of breath.    Cardiovascular:  Negative for chest pain and leg swelling.   Gastrointestinal:  Negative for diarrhea,

## 2025-06-23 NOTE — ASSESSMENT & PLAN NOTE
Monitored by specialist- no acute findings meriting change in the plan  Follow up with oncology/GI

## 2025-06-24 ENCOUNTER — OFFICE VISIT (OUTPATIENT)
Dept: SURGERY | Age: 70
End: 2025-06-24

## 2025-06-24 VITALS — BODY MASS INDEX: 19.62 KG/M2 | HEIGHT: 67 IN | WEIGHT: 125 LBS

## 2025-06-24 DIAGNOSIS — Z48.89 AFTERCARE FOLLOWING SURGERY: Primary | ICD-10-CM

## 2025-06-24 PROCEDURE — 99024 POSTOP FOLLOW-UP VISIT: CPT | Performed by: SURGERY

## 2025-06-24 NOTE — PROGRESS NOTES
Routine recheck.     Doing well from surgery.     PATH is favorable with negative margin and nodes. PET is negative. Follow with oncology.     Repeat colonoscopy within one year.     Call prn.

## 2025-07-02 ENCOUNTER — INITIAL CONSULT (OUTPATIENT)
Age: 70
End: 2025-07-02
Payer: MEDICARE

## 2025-07-02 VITALS
HEIGHT: 67 IN | SYSTOLIC BLOOD PRESSURE: 126 MMHG | DIASTOLIC BLOOD PRESSURE: 60 MMHG | HEART RATE: 68 BPM | BODY MASS INDEX: 19.78 KG/M2 | WEIGHT: 126 LBS

## 2025-07-02 DIAGNOSIS — J44.9 CHRONIC OBSTRUCTIVE PULMONARY DISEASE, UNSPECIFIED COPD TYPE (HCC): ICD-10-CM

## 2025-07-02 DIAGNOSIS — I49.9 IRREGULAR HEARTBEAT: ICD-10-CM

## 2025-07-02 DIAGNOSIS — R00.0 TACHYCARDIA: Primary | ICD-10-CM

## 2025-07-02 PROCEDURE — 1160F RVW MEDS BY RX/DR IN RCRD: CPT | Performed by: INTERNAL MEDICINE

## 2025-07-02 PROCEDURE — 99205 OFFICE O/P NEW HI 60 MIN: CPT | Performed by: INTERNAL MEDICINE

## 2025-07-02 PROCEDURE — 1159F MED LIST DOCD IN RCRD: CPT | Performed by: INTERNAL MEDICINE

## 2025-07-02 PROCEDURE — 1123F ACP DISCUSS/DSCN MKR DOCD: CPT | Performed by: INTERNAL MEDICINE

## 2025-07-02 PROCEDURE — 1126F AMNT PAIN NOTED NONE PRSNT: CPT | Performed by: INTERNAL MEDICINE

## 2025-07-02 ASSESSMENT — ENCOUNTER SYMPTOMS
HEMATOCHEZIA: 0
WHEEZING: 0
HEMATEMESIS: 0
HOARSE VOICE: 0
EYE REDNESS: 0
STRIDOR: 0
ABDOMINAL PAIN: 0
HEMOPTYSIS: 0
DOUBLE VISION: 0

## 2025-07-02 NOTE — PROGRESS NOTES
[facility] CARDIOLOGY  [address] [place] [address], [address]  [place] [address]  PHONE: [phone]          25    NAME:  Lei Garcia  : 1955  MRN: [id number]         SUBJECTIVE:   Lei Garcia is a 70 y.o. male seen for a visit regarding the following:     Chief Complaint   Patient presents with    Consultation     Irregular Heartbeat           HPI:    Cardio problem list:  1.  Tachycardia  2.  Irregular heartbeat  3.  COPD  4.  Colon cancer-s/p resection-May 2025.    Dear Dr. Ruffin,  I saw Mr. Garcia who is a 70-year-old gentleman in cardiovascular consultation for tachycardia/irregular heartbeats with underlying COPD and colon cancer.    Tachycardia/palpitations/irregular heartbeats-has dealt with this off-and-on over the past few months.  Had multiple runs of tachycardia while he was in the hospital when he underwent colon cancer surgery/resection-May 2025.  No TIAs or strokelike symptoms.  Was anemic and was in pain also at that point.  EKGs from the hospital only show sinus tachycardia nothing more than this.  We need to cardiovert any significant arrhythmia as there was concern for A-fib.  He has still had a few episodes of tachycardia since leaving the hospital but nothing as bad as what he had previously.  No TIAs or strokelike symptoms.    COPD-former smoker-quit in 2025-still vapes a little bit but understands need to quit this to completely.  No chest pain suggestive of angina.  No significant lower extremity edema orthopnea or PND.    Colon cancer-had resection in May 2025-doing well with no bleeding through his bowels but he did have a fair amount of anemia noticed postoperatively.  Being followed by hematology/oncology.      Past Medical History, Past Surgical History, Family history, Social History, and Medications were all reviewed with the patient today and updated as necessary.     No Known Allergies  Patient Active Problem List   Diagnosis    Chronic diarrhea

## 2025-07-07 ENCOUNTER — OFFICE VISIT (OUTPATIENT)
Dept: ONCOLOGY | Age: 70
End: 2025-07-07
Payer: MEDICARE

## 2025-07-07 ENCOUNTER — HOSPITAL ENCOUNTER (OUTPATIENT)
Dept: LAB | Age: 70
Discharge: HOME | End: 2025-07-07
Payer: MEDICARE

## 2025-07-07 VITALS
HEIGHT: 67 IN | WEIGHT: 125.3 LBS | SYSTOLIC BLOOD PRESSURE: 129 MMHG | RESPIRATION RATE: 17 BRPM | HEART RATE: 67 BPM | DIASTOLIC BLOOD PRESSURE: 70 MMHG | TEMPERATURE: 97.8 F | BODY MASS INDEX: 19.67 KG/M2 | OXYGEN SATURATION: 99 %

## 2025-07-07 DIAGNOSIS — C18.9 MALIGNANT NEOPLASM OF COLON, UNSPECIFIED PART OF COLON (HCC): Primary | ICD-10-CM

## 2025-07-07 DIAGNOSIS — D75.89 MACROCYTOSIS: ICD-10-CM

## 2025-07-07 DIAGNOSIS — C18.9 MALIGNANT NEOPLASM OF COLON, UNSPECIFIED PART OF COLON (HCC): ICD-10-CM

## 2025-07-07 LAB
ALBUMIN SERPL-MCNC: 3.8 G/DL (ref 3.2–4.6)
ALBUMIN/GLOB SERPL: 1 (ref 1–1.9)
ALP SERPL-CCNC: 83 U/L (ref 40–129)
ALT SERPL-CCNC: <5 U/L (ref 8–55)
ANION GAP SERPL CALC-SCNC: 14 MMOL/L (ref 7–16)
AST SERPL-CCNC: 12 U/L (ref 15–37)
BASOPHILS # BLD: 0.01 K/UL (ref 0–0.2)
BASOPHILS NFR BLD: 0.2 % (ref 0–2)
BILIRUB SERPL-MCNC: 0.3 MG/DL (ref 0–1.2)
BUN SERPL-MCNC: 10 MG/DL (ref 8–23)
CALCIUM SERPL-MCNC: 9.8 MG/DL (ref 8.8–10.2)
CEA SERPL-MCNC: 0.6 NG/ML (ref 0–3.8)
CHLORIDE SERPL-SCNC: 100 MMOL/L (ref 98–107)
CO2 SERPL-SCNC: 24 MMOL/L (ref 20–29)
CREAT SERPL-MCNC: 0.91 MG/DL (ref 0.8–1.3)
DIFFERENTIAL METHOD BLD: ABNORMAL
EOSINOPHIL # BLD: 0.02 K/UL (ref 0–0.8)
EOSINOPHIL NFR BLD: 0.4 % (ref 0.5–7.8)
ERYTHROCYTE [DISTWIDTH] IN BLOOD BY AUTOMATED COUNT: 18.3 % (ref 11.9–14.6)
FERRITIN SERPL-MCNC: 303 NG/ML (ref 8–388)
FOLATE SERPL-MCNC: 38 NG/ML (ref 3.1–17.5)
GLOBULIN SER CALC-MCNC: 4 G/DL (ref 2.3–3.5)
GLUCOSE SERPL-MCNC: 99 MG/DL (ref 70–99)
HCT VFR BLD AUTO: 30 % (ref 41.1–50.3)
HGB BLD-MCNC: 9.6 G/DL (ref 13.6–17.2)
IMM GRANULOCYTES # BLD AUTO: 0.01 K/UL (ref 0–0.5)
IMM GRANULOCYTES NFR BLD AUTO: 0.2 % (ref 0–5)
IRON SATN MFR SERPL: 47 % (ref 20–50)
IRON SERPL-MCNC: 118 UG/DL (ref 35–100)
LYMPHOCYTES # BLD: 1.39 K/UL (ref 0.5–4.6)
LYMPHOCYTES NFR BLD: 30.8 % (ref 13–44)
MCH RBC QN AUTO: 37.9 PG (ref 26.1–32.9)
MCHC RBC AUTO-ENTMCNC: 32 G/DL (ref 31.4–35)
MCV RBC AUTO: 118.6 FL (ref 82–102)
MONOCYTES # BLD: 0.8 K/UL (ref 0.1–1.3)
MONOCYTES NFR BLD: 17.7 % (ref 4–12)
NEUTS SEG # BLD: 2.29 K/UL (ref 1.7–8.2)
NEUTS SEG NFR BLD: 50.7 % (ref 43–78)
NRBC # BLD: 0 K/UL (ref 0–0.2)
PLATELET # BLD AUTO: 197 K/UL (ref 150–450)
PMV BLD AUTO: 10 FL (ref 9.4–12.3)
POTASSIUM SERPL-SCNC: 4.2 MMOL/L (ref 3.5–5.1)
PROT SERPL-MCNC: 7.8 G/DL (ref 6.3–8.2)
RBC # BLD AUTO: 2.53 M/UL (ref 4.23–5.6)
SODIUM SERPL-SCNC: 138 MMOL/L (ref 136–145)
TIBC SERPL-MCNC: 249 UG/DL (ref 240–450)
UIBC SERPL-MCNC: 131 UG/DL (ref 112–347)
VIT B12 SERPL-MCNC: 524 PG/ML (ref 193–986)
WBC # BLD AUTO: 4.5 K/UL (ref 4.3–11.1)

## 2025-07-07 PROCEDURE — 1159F MED LIST DOCD IN RCRD: CPT | Performed by: INTERNAL MEDICINE

## 2025-07-07 PROCEDURE — 1126F AMNT PAIN NOTED NONE PRSNT: CPT | Performed by: INTERNAL MEDICINE

## 2025-07-07 PROCEDURE — 82728 ASSAY OF FERRITIN: CPT

## 2025-07-07 PROCEDURE — 1160F RVW MEDS BY RX/DR IN RCRD: CPT | Performed by: INTERNAL MEDICINE

## 2025-07-07 PROCEDURE — 36415 COLL VENOUS BLD VENIPUNCTURE: CPT

## 2025-07-07 PROCEDURE — 82746 ASSAY OF FOLIC ACID SERUM: CPT

## 2025-07-07 PROCEDURE — 85025 COMPLETE CBC W/AUTO DIFF WBC: CPT

## 2025-07-07 PROCEDURE — 83540 ASSAY OF IRON: CPT

## 2025-07-07 PROCEDURE — 83550 IRON BINDING TEST: CPT

## 2025-07-07 PROCEDURE — 80053 COMPREHEN METABOLIC PANEL: CPT

## 2025-07-07 PROCEDURE — 1123F ACP DISCUSS/DSCN MKR DOCD: CPT | Performed by: INTERNAL MEDICINE

## 2025-07-07 PROCEDURE — 99214 OFFICE O/P EST MOD 30 MIN: CPT | Performed by: INTERNAL MEDICINE

## 2025-07-07 PROCEDURE — 82607 VITAMIN B-12: CPT

## 2025-07-07 PROCEDURE — 82378 CARCINOEMBRYONIC ANTIGEN: CPT

## 2025-07-07 RX ORDER — CAPECITABINE 500 MG/1
2000 TABLET, FILM COATED ORAL 2 TIMES DAILY
Qty: 112 TABLET | Refills: 7 | Status: ACTIVE | OUTPATIENT
Start: 2025-07-07 | End: 2025-07-21

## 2025-07-07 NOTE — PROGRESS NOTES
Data Source: Patient, [facility] record.    7/7/2025    12:51 PM    Lie Garcia [id number]    70 y.o.      Patient Encounter: [facility] Visit    Cancer Diagnosis:  Sigmoid colon cancer  Stage:  IIA  Performance Status:  2  Onc History (Copied from prior):   70-year-old male patient, worked in construction, recently quit smoking in 2/25, history of COPD, PAD more recently seen for chronic diarrhea and occasional blood per rectum.  Referred to GI Dr. Schwab and underwent EGD/colonoscopy 4/25/2025 showing gastritis, and a large nearly obstructing mass in sigmoid colon, unable to traverse mass with scope.  Multiple biopsies taken and tattoo placed.  Patient now referred to us.  Final pathology from sigmoid mass read as tubulovillous adenoma with high-grade dysplasia at least.  A deeper more advanced malignancy could not be excluded.  Patient underwent CT AP with contrast 4/28/2025 showing large proximal sigmoid colon malignancy measuring 7 x 5 cm with regional mesenteric adenopathy in the pelvis but no distant metastasis or liver lesions.  Possible focal pyelonephritis seen also.  Recent CEA was normal, ferritin high. Reports loose stools/diarrhea x 3 months. Occasional blood per rectum. Does report passing gas. Reports 20 lb weight loss over last 3 months.  Interval History:  7/7/2025: Patient seen in follow-up.  Reports doing well.  P.o. intake is improving.  Blood work unremarkable, mild anemia with hemoglobin 9.6 with high MCV.  B12 and folate levels pending.  CEA negative.  Iron stores adequate.  PET scan with CR.  Discussed options including adjuvant capecitabine.  Patient himself currently reluctant but will think about this later today.  Accompanying sister asks about ivermectin.  Hopefully he chooses adjuvant therapy in which case we will see him back in a few weeks (1 week into capecitabine)  REVIEW OF SYSTEMS:  As mentioned above, all other systems were reviewed in full and are

## 2025-07-07 NOTE — PATIENT INSTRUCTIONS
12.3 FL Final    nRBC 07/07/2025 0.00  0.0 - 0.2 K/uL Final    **Note: Absolute NRBC parameter is now reported with Hemogram**    Neutrophils % 07/07/2025 50.7  43.0 - 78.0 % Final    Lymphocytes % 07/07/2025 30.8  13.0 - 44.0 % Final    Monocytes % 07/07/2025 17.7 (H)  4.0 - 12.0 % Final    Eosinophils % 07/07/2025 0.4 (L)  0.5 - 7.8 % Final    Basophils % 07/07/2025 0.2  0.0 - 2.0 % Final    Immature Granulocytes % 07/07/2025 0.2  0.0 - 5.0 % Final    Neutrophils Absolute 07/07/2025 2.29  1.70 - 8.20 K/UL Final    Lymphocytes Absolute 07/07/2025 1.39  0.50 - 4.60 K/UL Final    Monocytes Absolute 07/07/2025 0.80  0.10 - 1.30 K/UL Final    Eosinophils Absolute 07/07/2025 0.02  0.00 - 0.80 K/UL Final    Basophils Absolute 07/07/2025 0.01  0.00 - 0.20 K/UL Final    Immature Granulocytes Absolute 07/07/2025 0.01  0.00 - 0.50 K/UL Final    Differential Type 07/07/2025 AUTOMATED    Final    Sodium 07/07/2025 138  136 - 145 mmol/L Final    Potassium 07/07/2025 4.2  3.5 - 5.1 mmol/L Final    Chloride 07/07/2025 100  98 - 107 mmol/L Final    CO2 07/07/2025 24  20 - 29 mmol/L Final    Anion Gap 07/07/2025 14  7 - 16 mmol/L Final    Glucose 07/07/2025 99  70 - 99 mg/dL Final    Comment: <70 mg/dL Consistent with, but not fully diagnostic of hypoglycemia.  100 - 125 mg/dL Impaired fasting glucose/consistent with pre-diabetes mellitus.  > 126 mg/dl Fasting glucose consistent with overt diabetes mellitus      BUN 07/07/2025 10  8 - 23 MG/DL Final    Creatinine 07/07/2025 0.91  0.80 - 1.30 MG/DL Final    Est, Glom Filt Rate 07/07/2025 90  >60 ml/min/1.73m2 Final    Comment:    Pediatric calculator link: https://www.kidney.org/professionals/kdoqi/gfr_calculatorped     These results are not intended for use in patients <18 years of age.     eGFR results are calculated without a race factor using  the 2021 CKD-EPI equation. Careful clinical correlation is recommended, particularly when comparing to results calculated using previous

## 2025-07-08 ENCOUNTER — CLINICAL DOCUMENTATION (OUTPATIENT)
Dept: CASE MANAGEMENT | Age: 70
End: 2025-07-08

## 2025-07-08 NOTE — PROGRESS NOTES
Chart review covering for GI navigation. Pt to start curative intent xeloda.  GI Navigation will continue to follow for plan of care.    Goal of next outreach: Chemo ed follow up

## 2025-07-14 ENCOUNTER — TELEPHONE (OUTPATIENT)
Dept: ONCOLOGY | Age: 70
End: 2025-07-14

## 2025-07-14 NOTE — TELEPHONE ENCOUNTER
Call to patient. He has decided to proceed with xeloda. They should be delivered on Thursday. He was notified to wait until after chemo ed appt to begin pills and then we will make follow up appt for a week or so after starting pills for tox check.

## 2025-07-18 ENCOUNTER — RESULTS FOLLOW-UP (OUTPATIENT)
Dept: CARDIOLOGY CLINIC | Age: 70
End: 2025-07-18

## 2025-07-18 ENCOUNTER — OFFICE VISIT (OUTPATIENT)
Dept: ONCOLOGY | Age: 70
End: 2025-07-18
Payer: MEDICARE

## 2025-07-18 DIAGNOSIS — C18.9 MALIGNANT NEOPLASM OF COLON, UNSPECIFIED PART OF COLON (HCC): Primary | ICD-10-CM

## 2025-07-18 DIAGNOSIS — R11.0 CHEMOTHERAPY-INDUCED NAUSEA: ICD-10-CM

## 2025-07-18 DIAGNOSIS — T45.1X5A CHEMOTHERAPY-INDUCED NAUSEA: ICD-10-CM

## 2025-07-18 PROCEDURE — 1160F RVW MEDS BY RX/DR IN RCRD: CPT | Performed by: NURSE PRACTITIONER

## 2025-07-18 PROCEDURE — 1159F MED LIST DOCD IN RCRD: CPT | Performed by: NURSE PRACTITIONER

## 2025-07-18 PROCEDURE — 1123F ACP DISCUSS/DSCN MKR DOCD: CPT | Performed by: NURSE PRACTITIONER

## 2025-07-18 PROCEDURE — 99214 OFFICE O/P EST MOD 30 MIN: CPT | Performed by: NURSE PRACTITIONER

## 2025-07-18 RX ORDER — ONDANSETRON 8 MG/1
8 TABLET, FILM COATED ORAL EVERY 8 HOURS PRN
Qty: 90 TABLET | Refills: 1 | Status: SHIPPED | OUTPATIENT
Start: 2025-07-18

## 2025-07-18 NOTE — PROGRESS NOTES
Lei Garcia is a 70 y.o. with colon cancer who presents for chemotherapy education for the following medications:  Capecitabine   Schedule and frequency of chemotherapy was discussed with patient - 2000 mg BID 14 days on and 7 days off per cycle    The following instructions regarding the handling and administration of oral chemotherapy were discussed....  - Wash hands before and after chemotherapy administration  - Store away from other medications and out of the reach of children or pets  - Never crush break or chew the medication  - Never \"double up on doses\" if a dose is missed  - Return discontinued or unused medication to the clinic for proper disposal   - Report medications including herbal supplements to all providers    The patient was given handouts published by the [employer] entitled, \"Chemotherapy and You\" and \"Eating Hints Before, During, and After Cancer Treatment\" for their reference.    Education was then given to the patient regarding the mechanism by which chemotherapy exerts its effects. It was explained that because chemotherapy affects all rapidly dividing cells, it not only affects the intended cancers cells, but can also effect cells in the GI tract, hair, mucous membranes, nails, and bone marrow.     It was then communicated to the patient he/she may experience some of the following side effects.....    Capecitabine Side Effects:  Allergic Reactions  Bruising  Chills  Constipation  Cough  Diarrhea  Dizziness  Feeling Faint  Fever  Infection  Injury  Itching  Low Red Blood Cell Count  Mouth Sores  Nausea  Numbness  Pain  Rash  Swelling  Tingling  Vomiting      Time was then allowed to accept patient questions.     The patient was instructed to call the office at [phone] for the following symptoms...                FEVER >100.4 or shaking chills    Nausea (interferes with ability to eat and unrelieved with prescribed medication)  Vomiting (vomiting more than 4-5 times in

## 2025-07-21 ENCOUNTER — CLINICAL DOCUMENTATION (OUTPATIENT)
Dept: CASE MANAGEMENT | Age: 70
End: 2025-07-21

## 2025-07-21 RX ORDER — METOPROLOL SUCCINATE 25 MG/1
25 TABLET, EXTENDED RELEASE ORAL DAILY
Qty: 90 TABLET | Refills: 3 | Status: CANCELLED | OUTPATIENT
Start: 2025-07-21

## 2025-07-21 NOTE — PROGRESS NOTES
Nurse Navigation outreach related to chemotherapy/biotherapy education    Chemotherapy/biotherapy education completed on 7/18.    Goal of next outreach: Post D1C1 follow up  Time Spent: 5 minutes

## 2025-07-23 RX ORDER — METOPROLOL SUCCINATE 25 MG/1
25 TABLET, EXTENDED RELEASE ORAL DAILY
Qty: 90 TABLET | Refills: 1 | Status: SHIPPED | OUTPATIENT
Start: 2025-07-23

## 2025-07-23 NOTE — TELEPHONE ENCOUNTER
Requested Prescriptions     Pending Prescriptions Disp Refills    metoprolol succinate (TOPROL XL) 25 MG extended release tablet 90 tablet 1     Sig: Take 1 tablet by mouth daily      Verified rx. Last OV 7/2/25. Erx to pharm on file. Adding medication based off of Dr. Andrea's result note of 7/18/25

## 2025-07-23 NOTE — TELEPHONE ENCOUNTER
Patient called stated that he had a pace maker insertion and that medication was supposed to be called in for him. He states he went to his pharmacy and nothing has been called in.  Patient would like a call back.

## 2025-07-23 NOTE — TELEPHONE ENCOUNTER
Pt states the pharmacy never got called for the rx and pt states he would like a call back to figure out what Is going on

## 2025-07-28 ENCOUNTER — HOSPITAL ENCOUNTER (OUTPATIENT)
Dept: LAB | Age: 70
Discharge: HOME | End: 2025-07-28
Payer: MEDICARE

## 2025-07-28 ENCOUNTER — OFFICE VISIT (OUTPATIENT)
Dept: ONCOLOGY | Age: 70
End: 2025-07-28
Payer: MEDICARE

## 2025-07-28 VITALS
TEMPERATURE: 97.7 F | WEIGHT: 129.4 LBS | DIASTOLIC BLOOD PRESSURE: 76 MMHG | SYSTOLIC BLOOD PRESSURE: 127 MMHG | BODY MASS INDEX: 20.31 KG/M2 | HEIGHT: 67 IN | OXYGEN SATURATION: 100 % | HEART RATE: 69 BPM | RESPIRATION RATE: 16 BRPM

## 2025-07-28 DIAGNOSIS — C18.9 MALIGNANT NEOPLASM OF COLON, UNSPECIFIED PART OF COLON (HCC): ICD-10-CM

## 2025-07-28 DIAGNOSIS — C18.9 MALIGNANT NEOPLASM OF COLON, UNSPECIFIED PART OF COLON (HCC): Primary | ICD-10-CM

## 2025-07-28 DIAGNOSIS — Z79.899 HIGH RISK MEDICATION USE: ICD-10-CM

## 2025-07-28 LAB
ALBUMIN SERPL-MCNC: 3.9 G/DL (ref 3.2–4.6)
ALBUMIN/GLOB SERPL: 1.1 (ref 1–1.9)
ALP SERPL-CCNC: 73 U/L (ref 40–129)
ALT SERPL-CCNC: 9 U/L (ref 8–55)
ANION GAP SERPL CALC-SCNC: 12 MMOL/L (ref 7–16)
AST SERPL-CCNC: 17 U/L (ref 15–37)
BASOPHILS # BLD: 0 K/UL (ref 0–0.2)
BASOPHILS NFR BLD: 0 % (ref 0–2)
BILIRUB SERPL-MCNC: 0.4 MG/DL (ref 0–1.2)
BUN SERPL-MCNC: 12 MG/DL (ref 8–23)
CALCIUM SERPL-MCNC: 9.6 MG/DL (ref 8.8–10.2)
CHLORIDE SERPL-SCNC: 97 MMOL/L (ref 98–107)
CO2 SERPL-SCNC: 27 MMOL/L (ref 20–29)
CREAT SERPL-MCNC: 0.83 MG/DL (ref 0.8–1.3)
DIFFERENTIAL METHOD BLD: ABNORMAL
EOSINOPHIL # BLD: 0.02 K/UL (ref 0–0.8)
EOSINOPHIL NFR BLD: 0.6 % (ref 0.5–7.8)
ERYTHROCYTE [DISTWIDTH] IN BLOOD BY AUTOMATED COUNT: 15.5 % (ref 11.9–14.6)
GLOBULIN SER CALC-MCNC: 3.7 G/DL (ref 2.3–3.5)
GLUCOSE SERPL-MCNC: 103 MG/DL (ref 70–99)
HCT VFR BLD AUTO: 29.1 % (ref 41.1–50.3)
HGB BLD-MCNC: 9.7 G/DL (ref 13.6–17.2)
IMM GRANULOCYTES # BLD AUTO: 0.01 K/UL (ref 0–0.5)
IMM GRANULOCYTES NFR BLD AUTO: 0.3 % (ref 0–5)
LYMPHOCYTES # BLD: 1.17 K/UL (ref 0.5–4.6)
LYMPHOCYTES NFR BLD: 32.4 % (ref 13–44)
MCH RBC QN AUTO: 39.1 PG (ref 26.1–32.9)
MCHC RBC AUTO-ENTMCNC: 33.3 G/DL (ref 31.4–35)
MCV RBC AUTO: 117.3 FL (ref 82–102)
MONOCYTES # BLD: 0.78 K/UL (ref 0.1–1.3)
MONOCYTES NFR BLD: 21.7 % (ref 4–12)
NEUTS SEG # BLD: 1.62 K/UL (ref 1.7–8.2)
NEUTS SEG NFR BLD: 45 % (ref 43–78)
NRBC # BLD: 0 K/UL (ref 0–0.2)
PLATELET # BLD AUTO: 186 K/UL (ref 150–450)
PLATELET COMMENT: ADEQUATE
PMV BLD AUTO: 9.4 FL (ref 9.4–12.3)
POTASSIUM SERPL-SCNC: 4 MMOL/L (ref 3.5–5.1)
PROT SERPL-MCNC: 7.6 G/DL (ref 6.3–8.2)
RBC # BLD AUTO: 2.48 M/UL (ref 4.23–5.6)
RBC MORPH BLD: ABNORMAL
RBC MORPH BLD: ABNORMAL
SODIUM SERPL-SCNC: 136 MMOL/L (ref 136–145)
WBC # BLD AUTO: 3.6 K/UL (ref 4.3–11.1)
WBC MORPH BLD: ABNORMAL

## 2025-07-28 PROCEDURE — 1123F ACP DISCUSS/DSCN MKR DOCD: CPT | Performed by: NURSE PRACTITIONER

## 2025-07-28 PROCEDURE — 1126F AMNT PAIN NOTED NONE PRSNT: CPT | Performed by: NURSE PRACTITIONER

## 2025-07-28 PROCEDURE — 1160F RVW MEDS BY RX/DR IN RCRD: CPT | Performed by: NURSE PRACTITIONER

## 2025-07-28 PROCEDURE — 85025 COMPLETE CBC W/AUTO DIFF WBC: CPT

## 2025-07-28 PROCEDURE — 99214 OFFICE O/P EST MOD 30 MIN: CPT | Performed by: NURSE PRACTITIONER

## 2025-07-28 PROCEDURE — 1159F MED LIST DOCD IN RCRD: CPT | Performed by: NURSE PRACTITIONER

## 2025-07-28 PROCEDURE — 80053 COMPREHEN METABOLIC PANEL: CPT

## 2025-07-28 ASSESSMENT — PATIENT HEALTH QUESTIONNAIRE - PHQ9
1. LITTLE INTEREST OR PLEASURE IN DOING THINGS: NOT AT ALL
2. FEELING DOWN, DEPRESSED OR HOPELESS: NOT AT ALL
SUM OF ALL RESPONSES TO PHQ QUESTIONS 1-9: 0

## 2025-07-28 NOTE — PROGRESS NOTES
margins.  40 lymph nodes resected negative for metastasis.  No lymphovascular or perineural invasion seen.  Per path, tumor is largely superficial but with marked underlying abscess formation and fibrous adhesions.  Final path [id number].  No defect in mismatch repair detected and low probability of MSI high.  Discussed final pathology and staging.  Will complete with PET scan prior to next visit and 4 to 5 weeks.    7/7/2025: Patient seen in follow-up.  Reports doing well.  P.o. intake is improving.  Blood work unremarkable, mild anemia with hemoglobin 9.6 with high MCV.  B12 and folate levels pending.  CEA negative.  Iron stores adequate.  PET scan with CR.  Discussed options including adjuvant capecitabine.  Patient himself currently reluctant but will think about this later today.  Accompanying sister asks about ivermectin.  Hopefully he chooses adjuvant therapy in which case we will see him back in a few weeks (1 week into capecitabine)    7/28/2025:  He is here today for follow up, now on adjuvant Xeloda, started C1 on July 19th.  He has been tolerating well thus far.  He denies any side effects or concerns.  Mild fatigue is present but managing well.  He denies any GI or bowel complaints.  He is eating well with a good appetite.  He denies any HFS or dry skin.  He denies any fevers or other infectious symptoms.  Labs reviewed and WBC/ANC mildly low and will monitor on subsequent labs.  Plan for repeat labs on 8/8 prior to starting C2 on 8/9, then FU with NP the end of the month prior to C3.            Stage IIA sigmoid colon cancer, high risk feature is near obstruction presentation.      PLAN:  - As above.  Recommended adjuvant capecitabine 1250 mg/m2 bid 2 out of 3 weeks x 8.   - Serial iron panel and CEA.  Given MCV high, check B12, folate, MMA, HC   - Repeat colonoscopy planned in 1 year.  - Sinus tachycardia: Seeing cardiology with need to rule out A-fib.  Has 7-day Holter monitor.    RTC in 4 weeks with

## 2025-08-01 ENCOUNTER — CLINICAL DOCUMENTATION (OUTPATIENT)
Dept: CASE MANAGEMENT | Age: 70
End: 2025-08-01

## 2025-08-01 NOTE — PROGRESS NOTES
Nurse Navigation outreach related to care coordination and post C1D1 symptom management    Other Called pt to discuss treatment tolerance and review oncology medical home information. Pt verbalizes understanding of how to contact the office. Pt denies any current barriers to care. Provided pt with navigation contact information. Navigation will follow up for survivorship care plan.    Navigation Assessment:  The following were identified as potential barriers to care:   1. None at this time.     Goal of next outreach: Survivorship care plan  Time Spent: 5 minutes

## 2025-08-04 ENCOUNTER — TELEPHONE (OUTPATIENT)
Dept: FAMILY MEDICINE CLINIC | Facility: CLINIC | Age: 70
End: 2025-08-04

## 2025-08-04 ENCOUNTER — TELEPHONE (OUTPATIENT)
Dept: ONCOLOGY | Age: 70
End: 2025-08-04

## 2025-08-04 DIAGNOSIS — K12.1 STOMATITIS: ICD-10-CM

## 2025-08-04 DIAGNOSIS — C18.9 MALIGNANT NEOPLASM OF COLON, UNSPECIFIED PART OF COLON (HCC): Primary | ICD-10-CM

## 2025-08-08 ENCOUNTER — HOSPITAL ENCOUNTER (OUTPATIENT)
Dept: LAB | Age: 70
Discharge: HOME | End: 2025-08-08
Payer: MEDICARE

## 2025-08-08 ENCOUNTER — TELEPHONE (OUTPATIENT)
Dept: ONCOLOGY | Age: 70
End: 2025-08-08

## 2025-08-08 DIAGNOSIS — Z79.899 HIGH RISK MEDICATION USE: ICD-10-CM

## 2025-08-08 DIAGNOSIS — C18.9 MALIGNANT NEOPLASM OF COLON, UNSPECIFIED PART OF COLON (HCC): ICD-10-CM

## 2025-08-08 LAB
ALBUMIN SERPL-MCNC: 3.8 G/DL (ref 3.2–4.6)
ALBUMIN/GLOB SERPL: 1 (ref 1–1.9)
ALP SERPL-CCNC: 70 U/L (ref 40–129)
ALT SERPL-CCNC: <5 U/L (ref 8–55)
ANION GAP SERPL CALC-SCNC: 12 MMOL/L (ref 7–16)
AST SERPL-CCNC: 15 U/L (ref 15–37)
BASOPHILS # BLD: 0.01 K/UL (ref 0–0.2)
BASOPHILS NFR BLD: 0.2 % (ref 0–2)
BILIRUB SERPL-MCNC: 0.3 MG/DL (ref 0–1.2)
BUN SERPL-MCNC: 12 MG/DL (ref 8–23)
CALCIUM SERPL-MCNC: 9.3 MG/DL (ref 8.8–10.2)
CHLORIDE SERPL-SCNC: 97 MMOL/L (ref 98–107)
CO2 SERPL-SCNC: 27 MMOL/L (ref 20–29)
CREAT SERPL-MCNC: 0.91 MG/DL (ref 0.8–1.3)
DIFFERENTIAL METHOD BLD: ABNORMAL
EOSINOPHIL # BLD: 0.02 K/UL (ref 0–0.8)
EOSINOPHIL NFR BLD: 0.5 % (ref 0.5–7.8)
ERYTHROCYTE [DISTWIDTH] IN BLOOD BY AUTOMATED COUNT: 15.1 % (ref 11.9–14.6)
GLOBULIN SER CALC-MCNC: 3.8 G/DL (ref 2.3–3.5)
GLUCOSE SERPL-MCNC: 98 MG/DL (ref 70–99)
HCT VFR BLD AUTO: 27.6 % (ref 41.1–50.3)
HGB BLD-MCNC: 9.3 G/DL (ref 13.6–17.2)
IMM GRANULOCYTES # BLD AUTO: 0.01 K/UL (ref 0–0.5)
IMM GRANULOCYTES NFR BLD AUTO: 0.2 % (ref 0–5)
LYMPHOCYTES # BLD: 0.95 K/UL (ref 0.5–4.6)
LYMPHOCYTES NFR BLD: 22.6 % (ref 13–44)
MAGNESIUM SERPL-MCNC: 2 MG/DL (ref 1.8–2.4)
MCH RBC QN AUTO: 38.9 PG (ref 26.1–32.9)
MCHC RBC AUTO-ENTMCNC: 33.7 G/DL (ref 31.4–35)
MCV RBC AUTO: 115.5 FL (ref 82–102)
MONOCYTES # BLD: 0.98 K/UL (ref 0.1–1.3)
MONOCYTES NFR BLD: 23.3 % (ref 4–12)
NEUTS SEG # BLD: 2.23 K/UL (ref 1.7–8.2)
NEUTS SEG NFR BLD: 53.2 % (ref 43–78)
NRBC # BLD: 0 K/UL (ref 0–0.2)
PLATELET # BLD AUTO: 187 K/UL (ref 150–450)
PMV BLD AUTO: 9.6 FL (ref 9.4–12.3)
POTASSIUM SERPL-SCNC: 4.1 MMOL/L (ref 3.5–5.1)
PROT SERPL-MCNC: 7.6 G/DL (ref 6.3–8.2)
RBC # BLD AUTO: 2.39 M/UL (ref 4.23–5.6)
SODIUM SERPL-SCNC: 136 MMOL/L (ref 136–145)
WBC # BLD AUTO: 4.2 K/UL (ref 4.3–11.1)

## 2025-08-08 PROCEDURE — 83735 ASSAY OF MAGNESIUM: CPT

## 2025-08-08 PROCEDURE — 80053 COMPREHEN METABOLIC PANEL: CPT

## 2025-08-08 PROCEDURE — 85025 COMPLETE CBC W/AUTO DIFF WBC: CPT

## 2025-08-08 PROCEDURE — 36415 COLL VENOUS BLD VENIPUNCTURE: CPT

## 2025-08-25 ENCOUNTER — OFFICE VISIT (OUTPATIENT)
Age: 70
End: 2025-08-25
Payer: MEDICARE

## 2025-08-25 VITALS
HEIGHT: 67 IN | BODY MASS INDEX: 20.09 KG/M2 | SYSTOLIC BLOOD PRESSURE: 126 MMHG | DIASTOLIC BLOOD PRESSURE: 72 MMHG | HEART RATE: 70 BPM | WEIGHT: 128 LBS

## 2025-08-25 DIAGNOSIS — I49.9 IRREGULAR HEARTBEAT: ICD-10-CM

## 2025-08-25 DIAGNOSIS — R00.0 TACHYCARDIA: Primary | ICD-10-CM

## 2025-08-25 DIAGNOSIS — J44.9 CHRONIC OBSTRUCTIVE PULMONARY DISEASE, UNSPECIFIED COPD TYPE (HCC): ICD-10-CM

## 2025-08-25 PROCEDURE — 99214 OFFICE O/P EST MOD 30 MIN: CPT | Performed by: INTERNAL MEDICINE

## 2025-08-25 PROCEDURE — 1159F MED LIST DOCD IN RCRD: CPT | Performed by: INTERNAL MEDICINE

## 2025-08-25 PROCEDURE — 1123F ACP DISCUSS/DSCN MKR DOCD: CPT | Performed by: INTERNAL MEDICINE

## 2025-08-25 PROCEDURE — 1125F AMNT PAIN NOTED PAIN PRSNT: CPT | Performed by: INTERNAL MEDICINE

## 2025-08-25 PROCEDURE — 1160F RVW MEDS BY RX/DR IN RCRD: CPT | Performed by: INTERNAL MEDICINE

## 2025-08-25 RX ORDER — CAPECITABINE 500 MG/1
TABLET, FILM COATED ORAL
COMMUNITY
Start: 2025-08-15

## 2025-08-25 RX ORDER — METOPROLOL SUCCINATE 25 MG/1
25 TABLET, EXTENDED RELEASE ORAL DAILY
Qty: 90 TABLET | Refills: 3 | Status: SHIPPED | OUTPATIENT
Start: 2025-08-25

## 2025-08-25 ASSESSMENT — ENCOUNTER SYMPTOMS
HOARSE VOICE: 0
HEMATOCHEZIA: 0
DOUBLE VISION: 0
STRIDOR: 0
HEMATEMESIS: 0
EYE REDNESS: 0
WHEEZING: 0
ABDOMINAL PAIN: 0
HEMOPTYSIS: 0

## 2025-08-29 ENCOUNTER — OFFICE VISIT (OUTPATIENT)
Dept: ONCOLOGY | Age: 70
End: 2025-08-29

## 2025-08-29 ENCOUNTER — HOSPITAL ENCOUNTER (OUTPATIENT)
Dept: LAB | Age: 70
Discharge: HOME | End: 2025-08-29
Payer: MEDICARE

## 2025-08-29 VITALS
HEART RATE: 78 BPM | TEMPERATURE: 98 F | BODY MASS INDEX: 20.4 KG/M2 | SYSTOLIC BLOOD PRESSURE: 133 MMHG | HEIGHT: 67 IN | WEIGHT: 130 LBS | OXYGEN SATURATION: 97 % | RESPIRATION RATE: 22 BRPM | DIASTOLIC BLOOD PRESSURE: 69 MMHG

## 2025-08-29 DIAGNOSIS — D50.8 IRON DEFICIENCY ANEMIA SECONDARY TO INADEQUATE DIETARY IRON INTAKE: ICD-10-CM

## 2025-08-29 DIAGNOSIS — C18.9 MALIGNANT NEOPLASM OF COLON, UNSPECIFIED PART OF COLON (HCC): ICD-10-CM

## 2025-08-29 DIAGNOSIS — C18.9 MALIGNANT NEOPLASM OF COLON, UNSPECIFIED PART OF COLON (HCC): Primary | ICD-10-CM

## 2025-08-29 DIAGNOSIS — Z79.899 HIGH RISK MEDICATION USE: ICD-10-CM

## 2025-08-29 DIAGNOSIS — K12.1 STOMATITIS: ICD-10-CM

## 2025-08-29 DIAGNOSIS — D64.9 ANEMIA, UNSPECIFIED TYPE: ICD-10-CM

## 2025-08-29 LAB
ALBUMIN SERPL-MCNC: 3.7 G/DL (ref 3.2–4.6)
ALBUMIN/GLOB SERPL: 1 (ref 1–1.9)
ALP SERPL-CCNC: 73 U/L (ref 40–129)
ALT SERPL-CCNC: <5 U/L (ref 8–55)
ANION GAP SERPL CALC-SCNC: 12 MMOL/L (ref 7–16)
AST SERPL-CCNC: 14 U/L (ref 15–37)
BASOPHILS # BLD: 0.01 K/UL (ref 0–0.2)
BASOPHILS NFR BLD: 0.2 % (ref 0–2)
BILIRUB SERPL-MCNC: 0.3 MG/DL (ref 0–1.2)
BUN SERPL-MCNC: 13 MG/DL (ref 8–23)
CALCIUM SERPL-MCNC: 9.6 MG/DL (ref 8.8–10.2)
CHLORIDE SERPL-SCNC: 101 MMOL/L (ref 98–107)
CO2 SERPL-SCNC: 27 MMOL/L (ref 20–29)
CREAT SERPL-MCNC: 0.89 MG/DL (ref 0.8–1.3)
DIFFERENTIAL METHOD BLD: ABNORMAL
EOSINOPHIL # BLD: 0.04 K/UL (ref 0–0.8)
EOSINOPHIL NFR BLD: 0.7 % (ref 0.5–7.8)
ERYTHROCYTE [DISTWIDTH] IN BLOOD BY AUTOMATED COUNT: 15.8 % (ref 11.9–14.6)
GLOBULIN SER CALC-MCNC: 3.7 G/DL (ref 2.3–3.5)
GLUCOSE SERPL-MCNC: 112 MG/DL (ref 70–99)
HCT VFR BLD AUTO: 25.1 % (ref 41.1–50.3)
HGB BLD-MCNC: 8.5 G/DL (ref 13.6–17.2)
IMM GRANULOCYTES # BLD AUTO: 0.03 K/UL (ref 0–0.5)
IMM GRANULOCYTES NFR BLD AUTO: 0.5 % (ref 0–5)
LYMPHOCYTES # BLD: 1.38 K/UL (ref 0.5–4.6)
LYMPHOCYTES NFR BLD: 25.1 % (ref 13–44)
MAGNESIUM SERPL-MCNC: 1.9 MG/DL (ref 1.8–2.4)
MCH RBC QN AUTO: 40.7 PG (ref 26.1–32.9)
MCHC RBC AUTO-ENTMCNC: 33.9 G/DL (ref 31.4–35)
MCV RBC AUTO: 120.1 FL (ref 82–102)
MONOCYTES # BLD: 0.92 K/UL (ref 0.1–1.3)
MONOCYTES NFR BLD: 16.7 % (ref 4–12)
NEUTS SEG # BLD: 3.12 K/UL (ref 1.7–8.2)
NEUTS SEG NFR BLD: 56.8 % (ref 43–78)
NRBC # BLD: 0 K/UL (ref 0–0.2)
PLATELET # BLD AUTO: 167 K/UL (ref 150–450)
PLATELET COMMENT: ADEQUATE
PMV BLD AUTO: 9.9 FL (ref 9.4–12.3)
POTASSIUM SERPL-SCNC: 4.1 MMOL/L (ref 3.5–5.1)
PROT SERPL-MCNC: 7.4 G/DL (ref 6.3–8.2)
RBC # BLD AUTO: 2.09 M/UL (ref 4.23–5.6)
RBC MORPH BLD: ABNORMAL
SODIUM SERPL-SCNC: 140 MMOL/L (ref 136–145)
WBC # BLD AUTO: 5.5 K/UL (ref 4.3–11.1)
WBC MORPH BLD: ABNORMAL

## 2025-08-29 PROCEDURE — 83735 ASSAY OF MAGNESIUM: CPT

## 2025-08-29 PROCEDURE — 80053 COMPREHEN METABOLIC PANEL: CPT

## 2025-08-29 PROCEDURE — 85025 COMPLETE CBC W/AUTO DIFF WBC: CPT

## 2025-08-29 PROCEDURE — 36415 COLL VENOUS BLD VENIPUNCTURE: CPT

## 2025-08-29 ASSESSMENT — PATIENT HEALTH QUESTIONNAIRE - PHQ9
SUM OF ALL RESPONSES TO PHQ QUESTIONS 1-9: 0
2. FEELING DOWN, DEPRESSED OR HOPELESS: NOT AT ALL
SUM OF ALL RESPONSES TO PHQ QUESTIONS 1-9: 0
SUM OF ALL RESPONSES TO PHQ QUESTIONS 1-9: 0
1. LITTLE INTEREST OR PLEASURE IN DOING THINGS: NOT AT ALL
SUM OF ALL RESPONSES TO PHQ QUESTIONS 1-9: 0

## (undated) DEVICE — Device

## (undated) DEVICE — STAPLER INT SZ 31MM H1.5-2.2MM OPN LEG L5.2MM PWR ADJ HT W/

## (undated) DEVICE — TROCAR: Brand: KII® SLEEVE

## (undated) DEVICE — GENERAL LAPAROSCOPY: Brand: MEDLINE INDUSTRIES, INC.

## (undated) DEVICE — TROCAR: Brand: KII FIOS FIRST ENTRY

## (undated) DEVICE — FORCEPS BX L240CM JAW DIA2.8MM L CAP W/ NDL MIC MESH TOOTH

## (undated) DEVICE — RELOAD STPL L75MM OPN H3.8MM CLS 1.5MM WIRE DIA0.2MM REG

## (undated) DEVICE — BAG DRNGE 9OZ L9.5IN BILE W/ ADPT TWO ADJ RUB BELT DISP FOR

## (undated) DEVICE — TUBING, SUCTION, 1/4" X 10', STRAIGHT: Brand: MEDLINE

## (undated) DEVICE — WOUND RETRACTOR AND PROTECTOR: Brand: ALEXIS O WOUND PROTECTOR-RETRACTOR

## (undated) DEVICE — DRAPE IRRIG FLD WRM W44XL44IN W/ AORN STD PRTBL INTRATEMP

## (undated) DEVICE — SUTURE VICRYL SZ 3-0 L18IN ABSRB UD L26MM SH 1/2 CIR J864D

## (undated) DEVICE — BLOCK BITE AD 60FR W/ VELC STRP ADDRESSES MOST PT AND

## (undated) DEVICE — AIRLIFE™ OXYGEN TUBING 7 FEET (2.1 M) CRUSH RESISTANT OXYGEN TUBING, VINYL TIPPED: Brand: AIRLIFE™

## (undated) DEVICE — LUBE JELLY FOIL PACK 1.4 OZ: Brand: MEDLINE INDUSTRIES, INC.

## (undated) DEVICE — STAPLER INT L75MM CUT LN L73MM STPL LN L77MM BLU B FRM 8

## (undated) DEVICE — COVER,MAYO STAND,STERILE: Brand: MEDLINE

## (undated) DEVICE — SUTURE PERMAHAND SZ 3-0 L18IN NONABSORBABLE BLK L26MM SH C013D

## (undated) DEVICE — SUTURE PDS II SZ 1 L96IN ABSRB VLT TP-1 L65MM 1/2 CIR Z880G

## (undated) DEVICE — 1LYRTR 16FR10ML 100%SILI SNAP: Brand: MEDLINE INDUSTRIES, INC.

## (undated) DEVICE — SYRINGE MEDICAL 3ML CLEAR PLASTIC STANDARD NON CONTROL LUERLOCK TIP DISPOSABLE

## (undated) DEVICE — SUTURE PERMAHAND SZ 2-0 L30IN NONABSORBABLE BLK SH L26MM C016D

## (undated) DEVICE — SINGLE PORT MANIFOLD: Brand: NEPTUNE 2

## (undated) DEVICE — RELOAD STPL H1.5X3.6XL60MM REG TISS BLU B FRM AUTO RET PIN

## (undated) DEVICE — NEEDLE SYRINGE 18GA L1.5IN RED PLAS HUB S STL BLNT FILL W/O

## (undated) DEVICE — STAPLER INT CUT LN 40MM STPL 51MM GRN CRV HD B FRM

## (undated) DEVICE — SHEET,DRAPE,53X77,STERILE: Brand: MEDLINE

## (undated) DEVICE — GOWN,SIRUS,NONRNF,SETINSLV,XL,20/CS: Brand: MEDLINE

## (undated) DEVICE — SYSTEM POS SZ 36 X 20 X 1 IN INTEGR ADJ ARM PROTECTOR LIFT

## (undated) DEVICE — Device: Brand: SPOT EX ENDOSCOPIC TATTOO

## (undated) DEVICE — SOLUTION IRRIG 3000ML H2O STRL BAG

## (undated) DEVICE — STAPLER INT L60MM REG TISS BLU B FRM 8 FIRING 2 ROW AUTO

## (undated) DEVICE — KENDALL RADIOLUCENT FOAM MONITORING ELECTRODE RECTANGULAR SHAPE: Brand: KENDALL

## (undated) DEVICE — GLOVE SURG SZ 65 THK91MIL LTX FREE SYN POLYISOPRENE

## (undated) DEVICE — SUTURE PERMAHAND SZ 2-0 L30IN NONABSORBABLE BLK SILK W/O A305H

## (undated) DEVICE — LOGICUT SCISSOR LENGTH 320MM: Brand: LOGI - LAPAROSCOPIC INSTRUMENT SYSTEM

## (undated) DEVICE — SUTURE PDS II SZ 0 L60IN ABSRB VLT L65MM TP-1 1/2 CIR Z991G

## (undated) DEVICE — GLOVE SURG SZ 6.5 L11.2IN THK8.6MIL LT BRN LTX FREE

## (undated) DEVICE — GROUNDING PAD

## (undated) DEVICE — TOWEL,OR,DSP,ST,BLUE,STD,4/PK,20PK/CS: Brand: MEDLINE

## (undated) DEVICE — GAUZE,SPONGE,4"X4",12PLY,WOVEN,NS,LF: Brand: MEDLINE

## (undated) DEVICE — TRAY,URINE METER,100% SILICONE,16FR10ML: Brand: MEDLINE

## (undated) DEVICE — SYRINGE MED 10ML LUERLOCK TIP W/O SFTY DISP

## (undated) DEVICE — ENDOSCOPIC KIT 1.1+ OP4 CA DE 2 GWN AAMI LEVEL 3

## (undated) DEVICE — JELLY LUBRICATING 10GM PREFIL SYR LUBE

## (undated) DEVICE — TUBING INSUFFLATION SMK EVAC HI FLO SET PNEUMOCLEAR

## (undated) DEVICE — GOWN,SIRUS,NON REINFRCD,LARGE,SET IN SL: Brand: MEDLINE

## (undated) DEVICE — LEGGINGS, PAIR, 31X48, STERILE: Brand: MEDLINE

## (undated) DEVICE — SUTURE PERMAHAND SZ 3-0 L30IN NONABSORBABLE BLK L26MM SH C017D

## (undated) DEVICE — DISPOSABLE BIOPSY VALVE MAJ-1555: Brand: SINGLE USE BIOPSY VALVE (STERILE)

## (undated) DEVICE — SOLUTION IRRIG 1000ML 09% SOD CHL USP PIC PLAS CONTAINER

## (undated) DEVICE — SOLUTION IRRIG 1000ML H2O PIC PLAS SHATTERPROOF CONTAINER

## (undated) DEVICE — MOUTHPIECE ENDOSCP L CTRL OPN AND SIDE PORTS DISP

## (undated) DEVICE — SUTURE PERMAHAND SZ 2-0 L18IN NONABSORBABLE BLK L26MM SH C012D

## (undated) DEVICE — PURSE STRING CLAMP, 11", (280 MM), (60 MM), NON-STERILE, REUSABLE: Brand: AESCULAP

## (undated) DEVICE — TROCARS: Brand: KII® BLUNT TIP ACCESS SYSTEM

## (undated) DEVICE — SPONGE LAP W18XL18IN WHT COT 4 PLY FLD STRUNG RADPQ DISP ST 2 PER PACK

## (undated) DEVICE — CONTAINER FORMALIN PREFILLED 10% NBF 60ML

## (undated) DEVICE — PENCIL ES L3M BTTN SWCH HOLSTER W/ BLDE ELECTRD EDGE

## (undated) DEVICE — STAPLER EXT 65MM S STL AUTO DISP PURSTRING

## (undated) DEVICE — GLOVE ORANGE PI 7 1/2   MSG9075

## (undated) DEVICE — SOLUTION ANTIFOG VIS SYS CLEARIFY LAPSCP

## (undated) DEVICE — SUTURE PDS + SZ 1 L96IN ABSRB VLT L65MM TP-1 1/2 CIR PDP880G

## (undated) DEVICE — CONNECTOR TBNG OD5-7MM O2 END DISP

## (undated) DEVICE — YANKAUER,BULB TIP,W/O VENT,RIGID,STERILE: Brand: MEDLINE

## (undated) DEVICE — DRAIN SURG 19FR 100% SIL RADPQ RND CHN FULL FLUT

## (undated) DEVICE — PUMP SUC IRR TBNG L10FT W/ HNDPC ASSEMB STRYKEFLOW 2

## (undated) DEVICE — SUTURE PERMAHAND SZ 0 L30IN NONABSORBABLE BLK SILK BRAID A306H